# Patient Record
Sex: FEMALE | Race: AMERICAN INDIAN OR ALASKA NATIVE | NOT HISPANIC OR LATINO | Employment: UNEMPLOYED | URBAN - METROPOLITAN AREA
[De-identification: names, ages, dates, MRNs, and addresses within clinical notes are randomized per-mention and may not be internally consistent; named-entity substitution may affect disease eponyms.]

---

## 2017-01-26 ENCOUNTER — THERAPY VISIT (OUTPATIENT)
Dept: PHYSICAL THERAPY | Facility: CLINIC | Age: 47
End: 2017-01-26
Payer: COMMERCIAL

## 2017-01-26 DIAGNOSIS — M25.572 ANKLE PAIN, LEFT: Primary | ICD-10-CM

## 2017-01-26 PROCEDURE — 97162 PT EVAL MOD COMPLEX 30 MIN: CPT | Mod: GP | Performed by: PHYSICAL THERAPIST

## 2017-01-26 PROCEDURE — 97110 THERAPEUTIC EXERCISES: CPT | Mod: GP | Performed by: PHYSICAL THERAPIST

## 2017-01-26 NOTE — PROGRESS NOTES
[facility] Initial Evaluation    Subjective:    Christiana Sabillon is a 46 year old female with a left ankle condition.  Condition occurred with:  A twist.    This is a new condition  Pt reports twisting her L ankle in summer 2015; she had a L ankle surgery on 6/25/15 which included ankle scope, lateral ankle stabilization, medial malleolar osteotomy for talar dome lesion debridementpt; Pt twisted her ankle on multiple occasions after the surgery; on 4/13/16 pt had L ankle arthroscopy and screw removal surgery; pt was feeling better until 4 months ago when she stepped into a tub and twisted her ankle again; she was given a steroid injection which made pain worse; she was given a CAM boot for 2 weeks for ankle swelling which helped; MD recommended PT for achilles tendinitis and plantar fascitis .    Patient reports pain:  Medial and posterior (heel).    Pain is described as aching and is constant and reported as 7/10.  Associated symptoms:  Numbness, buckling/giving out, loss of motion/stiffness and loss of strength. Pain is the same all the time.  Symptoms are exacerbated by walking, ascending stairs, descending stairs and bending/squatting (waling cassie 2-3 blocks) and relieved by ice and heat.    Special tests:  X-ray.      General health as reported by patient is good.  Pertinent medical history includes:  Mental illness, depression and overweight.    Other surgeries include:  Orthopedic surgery (R TKA 2012).  Current medications:  Anti-inflammatory, sleep medication and anti-depressants.  Current occupation is None  disabled.                                  Objective:      Gait:    Gait Type:  Antalgic     Deviations:  Ankle:  Heel strike decr L          Ankle/Foot Evaluation  ROM:    AROM:    Dorsiflexion:  Left:   0  Right:   12  Plantarflexion:  Left:  40    Right:  60  Inversion:  Left:  20     Right:  40  Eversion:  10     Right:  20    Great Toe Extension:  Left:  20     Right: 30  PROM:               Great Toe Extension:  Left:    30     Right: 40  Pain: severe pain with all ankle movements    Strength:    Dorsiflexion:  Left: 3-/5     Pain:   Right: 4+/5   Pain:  Plantarflexion: Left: 2+/5   Pain:   Right: 4/5  Pain:  Inversion:Left: 3-/5  Pain:     Right: 4/5  Pain:  Eversion:Left: 3-/5  Pain:  Right: 4/5  Pain:  Flexion Great Toe:Left: 3-/5  Pain:  Right: 3-/5   Pain:                  SPECIAL TESTS: Special tests ankle: tingling over lateral foot and lateral ankle with light touch.    PALPATION:   Left ankle tenderness present at:  achilles tendon; anterior tibialis; posterior tibialis; deltoid ligament; medial calcaneal; peroneals; anterior talofibular ligament; posterior talofibular ligament; calcaneofibular ligament; medial malleolus and lateral malleolus  Right ankle tenderness present at:   posterior tibialis  EDEMA:   Left ankle edema present at: lateral                                                              General     ROS    Assessment/Plan:      Patient is a 46 year old female with left side ankle complaints.    Patient has the following significant findings with corresponding treatment plan.                Diagnosis 1:  L ankle pain   Pain -  hot/cold therapy, electric stimulation, manual therapy, splint/taping/bracing/orthotics, self management, education, directional preference exercise and home program  Decreased ROM/flexibility - manual therapy, therapeutic exercise and home program  Decreased joint mobility - manual therapy, therapeutic exercise and home program  Decreased strength - therapeutic exercise and therapeutic activities  Impaired balance - neuro re-education and therapeutic activities  Decreased proprioception - neuro re-education and therapeutic activities  Edema - cold therapy  Impaired gait - gait training  Decreased function - therapeutic activities    Therapy Evaluation Codes:   1) History comprised of:   Personal factors that impact the plan of care:       Coping style, Time since onset of symptoms and Work status.    Comorbidity factors that impact the plan of care are:      Depression.     Medications impacting care: Anti-depressant and Anti-inflammatory.  2) Examination of Body Systems comprised of:   Body structures and functions that impact the plan of care:      Ankle.   Activity limitations that impact the plan of care are:      Sitting, Squatting/kneeling, Stairs and Walking.  3) Clinical presentation characteristics are:   Evolving/Changing.  4) Decision-Making    Moderate complexity using standardized patient assessment instrument and/or measureable assessment of functional outcome.  Cumulative Therapy Evaluation is: Moderate complexity.    Previous and current functional limitations:  (See Goal Flow Sheet for this information)    Short term and Long term goals: (See Goal Flow Sheet for this information)     Communication ability:  Patient appears to be able to clearly communicate and understand verbal and written communication and follow directions correctly.  Treatment Explanation - The following has been discussed with the patient:   RX ordered/plan of care  Anticipated outcomes  Possible risks and side effects  This patient would benefit from PT intervention to resume normal activities.   Rehab potential is fair.    Frequency:  1 X week, once daily  Duration:  for 10 weeks  Discharge Plan:  Achieve all LTG.  Independent in home treatment program.  Return to previous functional level by discharge.  Reach maximal therapeutic benefit.    Please refer to the daily flowsheet for treatment today, total treatment time and time spent performing 1:1 timed codes.

## 2017-01-30 NOTE — PROGRESS NOTES
Subjective:                                     General health as reported by patient is good.  Pertinent medical history includes:  Mental illness, depression, overweight, smoking and other (numbness/tingling, pain at night/rest, weakness, calf pain, swelling, warmth, ankle has lost feeling at times).  Medical allergies: yes (pcn keflex , cepfilaxin, tramadol, Narco Anti inflam T3).  Other surgeries include:  Orthopedic surgery and other (Joint replacement and alot).  Current medications:  Meds to increase bone density, sleep medication and anti-depressants.      Primary job tasks include:  Other and repetitive tasks (walking).    Barriers include:  None as reported by patient.    Red flags:  None as reported by patient.                      Objective:    System    Physical Exam    General     ROS    Assessment/Plan:

## 2017-02-09 ENCOUNTER — THERAPY VISIT (OUTPATIENT)
Dept: PHYSICAL THERAPY | Facility: CLINIC | Age: 47
End: 2017-02-09
Payer: COMMERCIAL

## 2017-02-09 DIAGNOSIS — G89.29 CHRONIC PAIN OF LEFT ANKLE: Primary | ICD-10-CM

## 2017-02-09 DIAGNOSIS — M25.572 CHRONIC PAIN OF LEFT ANKLE: Primary | ICD-10-CM

## 2017-02-09 PROCEDURE — 97110 THERAPEUTIC EXERCISES: CPT | Mod: GP | Performed by: PHYSICAL THERAPIST

## 2017-03-21 PROBLEM — M25.572 ANKLE PAIN, LEFT: Status: RESOLVED | Noted: 2017-01-26 | Resolved: 2017-03-21
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
## 2017-07-13 DIAGNOSIS — M26.609 TEMPOROMANDIBULAR JOINT DISORDER: Primary | ICD-10-CM

## 2017-10-13 ENCOUNTER — ANESTHESIA EVENT (OUTPATIENT)
Dept: SURGERY | Facility: CLINIC | Age: 47
End: 2017-10-13
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
## 2017-10-16 ASSESSMENT — LIFESTYLE VARIABLES: TOBACCO_USE: 1

## 2017-10-17 ENCOUNTER — ANESTHESIA (OUTPATIENT)
Dept: SURGERY | Facility: CLINIC | Age: 47
End: 2017-10-17

## 2017-10-26 ENCOUNTER — OFFICE VISIT (OUTPATIENT)
Dept: PODIATRY | Facility: CLINIC | Age: 47
End: 2017-10-26
Payer: COMMERCIAL

## 2017-10-26 ENCOUNTER — RADIANT APPOINTMENT (OUTPATIENT)
Dept: GENERAL RADIOLOGY | Facility: CLINIC | Age: 47
End: 2017-10-26
Attending: PODIATRIST
Payer: COMMERCIAL

## 2017-10-26 VITALS
DIASTOLIC BLOOD PRESSURE: 88 MMHG | BODY MASS INDEX: 35.84 KG/M2 | WEIGHT: 223 LBS | SYSTOLIC BLOOD PRESSURE: 118 MMHG | HEIGHT: 66 IN

## 2017-10-26 DIAGNOSIS — S93.491A SPRAIN OF OTHER LIGAMENT OF RIGHT ANKLE, INITIAL ENCOUNTER: ICD-10-CM

## 2017-10-26 DIAGNOSIS — G57.51 TARSAL TUNNEL SYNDROME, RIGHT: ICD-10-CM

## 2017-10-26 DIAGNOSIS — S93.491A SPRAIN OF OTHER LIGAMENT OF RIGHT ANKLE, INITIAL ENCOUNTER: Primary | ICD-10-CM

## 2017-10-26 PROCEDURE — 73610 X-RAY EXAM OF ANKLE: CPT | Mod: RT

## 2017-10-26 PROCEDURE — 99214 OFFICE O/P EST MOD 30 MIN: CPT | Performed by: PODIATRIST

## 2017-10-26 RX ORDER — DEXAMETHASONE SODIUM PHOSPHATE 4 MG/ML
INJECTION, SOLUTION INTRA-ARTICULAR; INTRALESIONAL; INTRAMUSCULAR; INTRAVENOUS; SOFT TISSUE
Qty: 30 ML | Refills: 0 | OUTPATIENT
Start: 2017-10-26 | End: 2017-12-15

## 2017-10-26 NOTE — PROGRESS NOTES
"Foot & Ankle Surgery   October 26, 2017    S:  Pt is seen today for evaluation of right ankle pain.  Pain can be severe, 6+/10.  History of \"8 or 9\" previous ankle sprains.  Concerned that what happened on the left is happening on the right.  It can be doing fine and it will \"just give out\".  No treatment at this point.  She's also noticing some pain in the medial L ankle, including some numbness in the heel.  \"can't feel it\".  she applies pressure when it's sore, which does help with some discomfort.  No injury to the left ankle.    Vitals:    10/26/17 1453   BP: 118/88   BP Location: Left arm   Patient Position: Sitting   Cuff Size: Adult Regular   Weight: 223 lb (101.2 kg)   Height: 5' 5.98\" (1.676 m)   '      ROS - Pos for CC.  Patient denies current nausea, vomiting, chills, fevers, belly pain, calf pain, chest pain or SOB.  Complete remainder of ROS it otherwise neg.      PE:  Gen:   No apparent distress  Eye:    Visual scanning without deficit  Ear:    Response to auditory stimuli wnl  Lung:    Non-labored breathing on RA noted  Abd:    NTND per patient report  Lymph:    Neg for pitting/non-pitting edema BLE  Vasc:    Pulses palpable, CFT minimally delayed  Neuro:    Light touch sensation intact to all sensory nerve distributions with paresthesias medial left heel.    Derm:    Neg for nodules, lesions or ulcerations  MSK:    R ankle - very tender over ATFL, CFL and posterior to fibula along peroneals.  Anterior drawer and talar tilt very painful over ATFL and CLF wihtout obvious subluxation.  L ankle - TCST very tender along tibial nerve and very tender along ICN.  Calf:    Neg for redness, swelling or tenderness      Imaging:  IMPRESSION: Lucent line along the lateral aspect of the talar dome on the AP view could represent a small nondisplaced fracture. Bones  appear slightly osteopenic. Ankle mortise joint is intact. Mild soft  tissue swelling over the lateral aspect of the ankle.        Assessment:  46 " year old female with right ankle sprains; left tarsal tunnel syndrome and jacques's neuritis      Plan:  Discussed etiologies/options  1.  Right ankle pain after previous sprains  -xrays as above, personally reviewed  -RICE/NSAID prn  -triloc ankle brace  -[facility] PT referral for functional rehab  -per Rad read, CT ordered for right ankle.  Patient notified via VM.      2.  Left tarsal tunnel and jacques's neuritis  -comfortable shoe gear  -RICE/NSAID prn  -massage as needed  -consider PO vs steroid injection     Follow up:  After PT or sooner with acute issues      Body mass index is 36.01 kg/(m^2).  Weight management plan: Patient was referred to their PCP to discuss a diet and exercise plan.         Savage Samuels DPM   Podiatric Foot & Ankle Surgeon  [facility]  [phone]

## 2017-10-26 NOTE — NURSING NOTE
"Chief Complaint   Patient presents with     Ankle Pain     Right ankle; instability       Initial /88 (BP Location: Left arm, Patient Position: Sitting, Cuff Size: Adult Regular)  Ht 5' 5.98\" (1.676 m)  Wt 223 lb (101.2 kg)  BMI 36.01 kg/m2 Estimated body mass index is 36.01 kg/(m^2) as calculated from the following:    Height as of this encounter: 5' 5.98\" (1.676 m).    Weight as of this encounter: 223 lb (101.2 kg).  Medication Reconciliation: complete    "

## 2017-10-26 NOTE — MR AVS SNAPSHOT
After Visit Summary   10/26/2017    Christiana Sabillon    MRN: [id number]           Patient Information     Date Of Birth          1970        Visit Information        Provider Department      10/26/2017 3:00 PM Savage Tucker DPM [facility]        Today's Diagnoses     Sprain of other ligament of right ankle, initial encounter    -  1    Tarsal tunnel syndrome, right          Care Instructions      DR. TUCKER'S CLINIC LOCATIONS:   MONDAY - [place] TUESDAY - [place]   [address] [facility]  [address] [facility] [address]   [place] [address] [place] [address]   [phone]       THURSDAY AM - [place] THURSDAY PM - [facility]   [address]   [place] [address] [place], [place] [address] [phone]       FRIDAY AM - [place] SET UP SURGERY: [phone] [address] APPOINTMENTS: [phone]   [place] [address] BILLING QUESTIONS: [phone] FAX NUMBER: [phone]     Follow Up: after completing physical therapy if still having pain    PRICE Therapy    Many aches and pains throughout the foot and ankle can be helped with many simple treatments.  This is usually described as PRICE Therapy.      P - Protection - often times, inflammation/pain in the lower extremity is not able to improve simply because the areas involved are never allowed to rest.  Every step we take can bother the problematic area.  Protecting those areas is an important step in the healing process.  This may involve a walking cast boot, a special insert/orthotic device, an ankle brace, or simply avoiding barefoot walking.    R - Rest - in addition to protecting the foot/ankle, resting is an important, but often times difficult, treatment option.  Getting off your feet when they bother you, and specifically avoiding activities that cause pain/discomfort, are very beneficial to prevent, and treat, foot/ankle pain.      I - Ice -  icing regularly can help to decrease inflammation and swelling in the foot, thus decreasing pain.  Using an ice pack or a bag of frozen peas works very well.  Ice for 20 minutes multiple times per day as needed.  Do not place the ice directly on the skin as this can cause tissue damage.    C - Compression - using a compression wrap or an ACE wrap can help to decrease swelling, which can help to decrease pain.  Wearing the wraps is generally not needed at night, but they should be worn on a regular basis when you are going to be on your feet for prolonged periods as gravity tends to pull fluids down to your feet/ankles.    E - Elevation - elevating your lower extremities multiple times daily for 15-20 minutes can help to decrease swelling, which works well in decreasing pain levels.      NSAID/Tylenol - An anti-inflammatory, like Aleve or ibuprofen, and/or a pain medication, such as Tylenol, can help to improve pain levels and get the issue resolved sooner rather than later.  Anyone with liver issues should be careful with Tylenol, and anyone with high blood pressure or heart, stomach or kidney issues should be careful with anti-inflammatories.  Please ask if you have questions about these medications, including dosage.          Body Mass Index (BMI)  Many things can cause foot and ankle problems. Foot structure, activity level, foot mechanics and injuries are common causes of pain. One very important issue that often goes unmentioned, is body weight. Extra weight can cause increased stress on muscles, ligaments, bones and tendons. Sometimes just a few extra pounds is all it takes to put one over her/his threshold. Without reducing that stress, it can be difficult to alleviate pain. Some people are uncomfortable addressing this issue, but we feel it is important for you to think about it. As Foot &  Ankle specialists, our job is addressing the lower extremity problem and possible causes. Regarding extra body weight,  "we encourage patients to discuss diet and weight management plans with their primary care doctors. It is this team approach that gives you the best opportunity for pain relief and getting you back on your feet.            Follow-ups after your visit        Additional Services     [facility] PT, HAND, AND CHIROPRACTIC REFERRAL       === This order will print in the [facility] Scheduling  Office ===    Physical therapy, hand therapy and chiropractic care are available through:    [facility]  [facility]    Call one easy number to schedule at any of the above locations:  [phone].    Your provider has referred you to Physical Therapy at [facility] or [facility]    Indication/Reason for Referral: right ankle pain, \"8 or 9\" previous sprains, tender over ATFL and CFL and peroneal tendons; left lower extremity tarsal tunnel syndrome  Onset of Illness:  months  Therapy Orders:  Evaluate and Treat  Special Programs:  None  Special Request:  Equipment: As Indicated:   Exercise: Active/Assistive ROM, Conditioning, Home Exercise Program, Passive ROM, Posture/Body Mechanics, Progressive Strengthening and Stretching/Flexibility  Manual Therapy: Joint Mobilization and Myofascial Release/Massage  Modalities: As Indicated: , Iontophoresis (Please Order: Dexamethasone Sodium Phosphate - 4mg/ml injectable, 30 cc total volume) and Ultrasound    Additional Comments for the therapist or chiropractor:  8 sessions    Please be aware that coverage of these services is subject to the terms and limitations of your health insurance plan.  Call member services at your health plan with any benefit or coverage questions.      Please bring the following to your appointment:    >>   Your personal calendar for scheduling future appointments  >>   Comfortable clothing                  Who to contact     If you have questions or need follow up information about today's clinic visit or your schedule please " "contact [facility] directly at [phone].  Normal or non-critical lab and imaging results will be communicated to you by MyChart, letter or phone within 4 business days after the clinic has received the results. If you do not hear from us within 7 days, please contact the clinic through MyChart or phone. If you have a critical or abnormal lab result, we will notify you by phone as soon as possible.  Submit refill requests through [employer] or call your pharmacy and they will forward the refill request to us. Please allow 3 business days for your refill to be completed.          Additional Information About Your Visit        [employer] Information     [employer] lets you send messages to your doctor, view your test results, renew your prescriptions, schedule appointments and more. To sign up, go to [email or web address] . Click on \"Log in\" on the left side of the screen, which will take you to the Welcome page. Then click on \"Sign up Now\" on the right side of the page.     You will be asked to enter the access code listed below, as well as some personal information. Please follow the directions to create your username and password.     Your access code is: [email or web address]  Expires: 2018  6:31 AM     Your access code will  in 90 days. If you need help or a new code, please call your [facility] clinic or [phone].        Care EveryWhere ID     This is your Care EveryWhere ID. This could be used by other organizations to access your [facility] medical records  [id number]        Your Vitals Were     Height BMI (Body Mass Index)                5' 5.98\" (1.676 m) 36.01 kg/m2           Blood Pressure from Last 3 Encounters:   10/26/17 118/88   16 142/86   16 125/90    Weight from Last 3 Encounters:   10/26/17 223 lb (101.2 kg)   16 231 lb (104.8 kg)   16 231 lb (104.8 kg)              We Performed the Following     [facility] PT, HAND, AND CHIROPRACTIC REFERRAL          Today's " Medication Changes          These changes are accurate as of: 10/26/17  3:04 PM.  If you have any questions, ask your nurse or doctor.               These medicines have changed or have updated prescriptions.        Dose/Directions    * dexamethasone 4 MG/ML injection   Commonly known as:  DECADRON   This may have changed:  Another medication with the same name was added. Make sure you understand how and when to take each.   Used for:  Plantar fascial fibromatosis, Pain of left heel, Achilles tendinitis of left lower extremity, Peroneal tendonitis, left   Changed by:  Savage Samuels DPM        Dose:  4 mg   Apply 1 mL (4 mg) topically as needed   Quantity:  30 mL   Refills:  0       * dexamethasone 4 MG/ML injection   Commonly known as:  DECADRON   This may have changed:  Another medication with the same name was added. Make sure you understand how and when to take each.   Used for:  Plantar fascial fibromatosis, Pain of left heel, Achilles tendinitis of left lower extremity, Peroneal tendonitis, left   Changed by:  Savage Samuels DPM        Dose:  4 mg   Apply 1 mL (4 mg) topically as needed   Quantity:  30 mL   Refills:  0       * dexamethasone 4 MG/ML injection   Commonly known as:  DECADRON   This may have changed:  Another medication with the same name was added. Make sure you understand how and when to take each.   Used for:  Intractable left heel pain, Achilles tendinitis of left lower extremity   Changed by:  Savage Samuels DPM        Dose:  4 mg   Apply 1 mL (4 mg) topically as needed   Quantity:  30 mL   Refills:  0       * dexamethasone 4 MG/ML injection   Commonly known as:  DECADRON   This may have changed:  You were already taking a medication with the same name, and this prescription was added. Make sure you understand how and when to take each.   Used for:  Sprain of other ligament of right ankle, initial encounter, Tarsal tunnel syndrome, right   Changed by:  Savage Samuels  DPM        To be used by therapist during PT sessions.   Quantity:  30 mL   Refills:  0       * order for DME   This may have changed:  Another medication with the same name was added. Make sure you understand how and when to take each.   Used for:  S/P foot surgery   Changed by:  Savage Samuels DPANDRÉS        Equipment being ordered: aluminum walker x 4 months   Quantity:  1 Device   Refills:  0       * order for DME   This may have changed:  Another medication with the same name was added. Make sure you understand how and when to take each.   Used for:  Left ankle pain   Changed by:  Savage Samuels DPM        Equipment being ordered: CAM   Quantity:  1 Device   Refills:  0       * order for DME   This may have changed:  Another medication with the same name was added. Make sure you understand how and when to take each.   Used for:  Plantar fascial fibromatosis, Pain of left heel, Achilles tendinitis of left lower extremity, Peroneal tendonitis, left   Changed by:  Savage Samuels DPM        Equipment being ordered: tall CAM, size 8   Quantity:  1 Device   Refills:  0       * order for DME   This may have changed:  You were already taking a medication with the same name, and this prescription was added. Make sure you understand how and when to take each.   Used for:  Sprain of other ligament of right ankle, initial encounter, Tarsal tunnel syndrome, right   Changed by:  Savage Samuels DPANDRÉS        Equipment being ordered: size 10 triloc ankle brace right lower extremity   Quantity:  1 Device   Refills:  0       * Notice:  This list has 8 medication(s) that are the same as other medications prescribed for you. Read the directions carefully, and ask your doctor or other care provider to review them with you.         Where to get your medicines      Some of these will need a paper prescription and others can be bought over the counter.  Ask your nurse if you have questions.     Bring a paper  prescription for each of these medications     order for DME       You don't need a prescription for these medications     dexamethasone 4 MG/ML injection                Primary Care Provider Office Phone # Fax #    Delta Donis [phone]       [facility] [address] [facility] [address]        Equal Access to Services     KAREN TAPIA : Hadii aad ku hadasho Soomaali, waaxda luqadaha, qaybta kaalmada adeegyada, waxreshma son haycurtisn adejose juan ferchoneela waters. So [facility] [phone].    ATENCIÓN: Si habla español, tiene a araiza disposición servicios gratuitos de asistencia lingüística. [facility] [phone].    We comply with applicable federal civil rights laws and [place] laws. We do not discriminate on the basis of race, color, national origin, age, disability, sex, sexual orientation, or gender identity.            Thank you!     Thank you for choosing [facility]  for your care. Our goal is always to provide you with excellent care. Hearing back from our patients is one way we can continue to improve our services. Please take a few minutes to complete the written survey that you may receive in the mail after your visit with us. Thank you!             Your Updated Medication List - Protect others around you: Learn how to safely use, store and throw away your medicines at www.disposemymeds.org.          This list is accurate as of: 10/26/17  3:04 PM.  Always use your most recent med list.                   Brand Name Dispense Instructions for use Diagnosis    albuterol 108 (90 BASE) MCG/ACT Inhaler    PROAIR HFA/PROVENTIL HFA/VENTOLIN HFA     Inhale 2 puffs into the lungs every 4 hours as needed for shortness of breath / dyspnea or wheezing        * ALPRAZOLAM PO      Take 0.5 mg by mouth 2 times daily as needed for anxiety        * ALPRAZOLAM PO      Take 0.25 mg by mouth daily as needed for anxiety (Give in addition to Alprazolam 0.5mg BID PRN, if needed.)        AMBIEN  PO      Take 5-10 mg by mouth nightly as needed        ASPIRIN PO      Take 325 mg by mouth daily        * dexamethasone 4 MG/ML injection    DECADRON    30 mL    Apply 1 mL (4 mg) topically as needed    Plantar fascial fibromatosis, Pain of left heel, Achilles tendinitis of left lower extremity, Peroneal tendonitis, left       * dexamethasone 4 MG/ML injection    DECADRON    30 mL    Apply 1 mL (4 mg) topically as needed    Plantar fascial fibromatosis, Pain of left heel, Achilles tendinitis of left lower extremity, Peroneal tendonitis, left       * dexamethasone 4 MG/ML injection    DECADRON    30 mL    Apply 1 mL (4 mg) topically as needed    Intractable left heel pain, Achilles tendinitis of left lower extremity       * dexamethasone 4 MG/ML injection    DECADRON    30 mL    To be used by therapist during PT sessions.    Sprain of other ligament of right ankle, initial encounter, Tarsal tunnel syndrome, right       divalproex 500 MG 24 hr tablet    DEPAKOTE ER     Take 2,000 mg by mouth At Bedtime        GABAPENTIN PO      Take 800 mg by mouth 2 times daily        LATUDA PO           lithium 450 MG CR tablet    ESKALITH     Take 450 mg by mouth 2 times daily        * order for DME     1 Device    Equipment being ordered: aluminum walker x 4 months    S/P foot surgery       * order for DME     1 Device    Equipment being ordered: CAM    Left ankle pain       * order for DME     1 Device    Equipment being ordered: tall CAM, size 8    Plantar fascial fibromatosis, Pain of left heel, Achilles tendinitis of left lower extremity, Peroneal tendonitis, left       * order for DME     1 Device    Equipment being ordered: size 10 triloc ankle brace right lower extremity    Sprain of other ligament of right ankle, initial encounter, Tarsal tunnel syndrome, right       * prazosin 5 MG capsule    MINIPRESS     Take 5 mg by mouth At Bedtime        * prazosin 2 MG capsule    MINIPRESS     Take 2 mg by mouth See Admin  Instructions In addition to 5 mg Prazosin, may take 1-5 of the 2mg Prazosin capsules at bedtime.        PRENATAL VITAMIN PO      Take 1 tablet by mouth daily        PROPRANOLOL HCL PO      Take 20 mg by mouth 3 times daily        SAPHRIS SL      Place 10 mg under the tongue At Bedtime        * Notice:  This list has 12 medication(s) that are the same as other medications prescribed for you. Read the directions carefully, and ask your doctor or other care provider to review them with you.

## 2017-10-26 NOTE — PATIENT INSTRUCTIONS
DR. TUCKER'S CLINIC LOCATIONS:   MONDAY - [place]  TUESDAY - [place]   [address] [facility]  [address]   [place] [address] [place] [address]   [phone]       THURSDAY AM - [place] [facility]   [address]   [place] [address] [place], [place] [address] [phone]       FRIDAY AM - [place] SET UP SURGERY: [phone] [address] APPOINTMENTS: [phone]   [place] [address] BILLING QUESTIONS: [phone] FAX NUMBER: [phone]     Follow Up: after completing physical therapy if still having pain    PRICE Therapy    Many aches and pains throughout the foot and ankle can be helped with many simple treatments.  This is usually described as PRICE Therapy.      P - Protection - often times, inflammation/pain in the lower extremity is not able to improve simply because the areas involved are never allowed to rest.  Every step we take can bother the problematic area.  Protecting those areas is an important step in the healing process.  This may involve a walking cast boot, a special insert/orthotic device, an ankle brace, or simply avoiding barefoot walking.    R - Rest - in addition to protecting the foot/ankle, resting is an important, but often times difficult, treatment option.  Getting off your feet when they bother you, and specifically avoiding activities that cause pain/discomfort, are very beneficial to prevent, and treat, foot/ankle pain.      I - Ice - icing regularly can help to decrease inflammation and swelling in the foot, thus decreasing pain.  Using an ice pack or a bag of frozen peas works very well.  Ice for 20 minutes multiple times per day as needed.  Do not place the ice directly on the skin as this can cause tissue damage.    C - Compression - using a compression wrap or an ACE wrap can help to decrease swelling, which can help to decrease pain.  Wearing the wraps is  generally not needed at night, but they should be worn on a regular basis when you are going to be on your feet for prolonged periods as gravity tends to pull fluids down to your feet/ankles.    E - Elevation - elevating your lower extremities multiple times daily for 15-20 minutes can help to decrease swelling, which works well in decreasing pain levels.      NSAID/Tylenol - An anti-inflammatory, like Aleve or ibuprofen, and/or a pain medication, such as Tylenol, can help to improve pain levels and get the issue resolved sooner rather than later.  Anyone with liver issues should be careful with Tylenol, and anyone with high blood pressure or heart, stomach or kidney issues should be careful with anti-inflammatories.  Please ask if you have questions about these medications, including dosage.          Body Mass Index (BMI)  Many things can cause foot and ankle problems. Foot structure, activity level, foot mechanics and injuries are common causes of pain. One very important issue that often goes unmentioned, is body weight. Extra weight can cause increased stress on muscles, ligaments, bones and tendons. Sometimes just a few extra pounds is all it takes to put one over her/his threshold. Without reducing that stress, it can be difficult to alleviate pain. Some people are uncomfortable addressing this issue, but we feel it is important for you to think about it. As Foot &  Ankle specialists, our job is addressing the lower extremity problem and possible causes. Regarding extra body weight, we encourage patients to discuss diet and weight management plans with their primary care doctors. It is this team approach that gives you the best opportunity for pain relief and getting you back on your feet.

## 2017-12-15 RX ORDER — DOXYCYCLINE 100 MG/1
CAPSULE ORAL 2 TIMES DAILY
Status: ON HOLD | COMMUNITY
End: 2017-12-19

## 2017-12-15 RX ORDER — OXYCODONE AND ACETAMINOPHEN 5; 325 MG/1; MG/1
1 TABLET ORAL EVERY 8 HOURS PRN
Status: ON HOLD | COMMUNITY
End: 2017-12-19

## 2017-12-17 RX ORDER — CLINDAMYCIN PHOSPHATE 900 MG/50ML
900 INJECTION, SOLUTION INTRAVENOUS
Status: CANCELLED | OUTPATIENT
Start: 2017-12-17

## 2017-12-17 RX ORDER — CHLORHEXIDINE GLUCONATE ORAL RINSE 1.2 MG/ML
10 SOLUTION DENTAL ONCE
Status: CANCELLED | OUTPATIENT
Start: 2017-12-17 | End: 2017-12-17

## 2017-12-17 RX ORDER — CLINDAMYCIN PHOSPHATE 900 MG/50ML
900 INJECTION, SOLUTION INTRAVENOUS SEE ADMIN INSTRUCTIONS
Status: CANCELLED | OUTPATIENT
Start: 2017-12-17

## 2017-12-17 NOTE — PROGRESS NOTES
Oral & Maxillofacial Surgery pre-operative note:    Christiana Sabillon MRN# [id number]   Age: 46 year old YOB: [birth year]    Pre-Operative Diagnosis:  1. Left TMJ arthralgia  2. 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 Left TMJ arthroplasty   2. Removal of heterotopic bone and osteophytes of left condylar head    Planned Anesthesia: General via oral endotracheal tube    Surgeon:  Dr. Thuan Chaudhary DDS, MD    Resident Surgeon: Wei Escudero DDS  Resident Assistants: Ke Phoenix DDS    Consent:  Written and verbal consent obtained from patient previously.  Discussion included risks/complications including but not limited post-operative pain, swelling, bleeding, infection, hardware failure, nonunion, malunion, dehiscence of wounds, temporary/permanent paresthesia/anesthesia of CN V3 mental nerve distribution/CN V2 maxillary nerve distribution, damage to CN VII (motor to muscles of facial expression) with temporary or permanent paralysis, scar formation, malocclusion, failure to resolve chief complaint, or need for additional procedures (occlusal equilibration/endodontic therapy).  Patient agrees to procedure as written, signed consent in chart.    Ke Phoenix DDS  Oral & Maxillofacial Surgery  [phone]

## 2017-12-19 ENCOUNTER — ANESTHESIA EVENT (OUTPATIENT)
Dept: SURGERY | Facility: CLINIC | Age: 47
End: 2017-12-19
Payer: COMMERCIAL

## 2017-12-19 ENCOUNTER — HOSPITAL ENCOUNTER (OUTPATIENT)
Facility: CLINIC | Age: 47
Discharge: HOME | End: 2017-12-19
Attending: DENTIST | Admitting: DENTIST
Payer: COMMERCIAL

## 2017-12-19 ENCOUNTER — ANESTHESIA (OUTPATIENT)
Dept: SURGERY | Facility: CLINIC | Age: 47
End: 2017-12-19
Payer: COMMERCIAL

## 2017-12-19 VITALS
HEIGHT: 67 IN | DIASTOLIC BLOOD PRESSURE: 74 MMHG | WEIGHT: 209.44 LBS | BODY MASS INDEX: 32.87 KG/M2 | RESPIRATION RATE: 12 BRPM | SYSTOLIC BLOOD PRESSURE: 132 MMHG | OXYGEN SATURATION: 98 % | TEMPERATURE: 96.7 F

## 2017-12-19 DIAGNOSIS — M19.09 TEMPOROMANDIBULAR JOINT OSTEOARTHRITIS: Primary | ICD-10-CM

## 2017-12-19 LAB
GLUCOSE BLDC GLUCOMTR-MCNC: 114 MG/DL (ref 70–99)
GLUCOSE BLDC GLUCOMTR-MCNC: 131 MG/DL (ref 70–99)

## 2017-12-19 PROCEDURE — 71000015 ZZH RECOVERY PHASE 1 LEVEL 2 EA ADDTL HR: Performed by: DENTIST

## 2017-12-19 PROCEDURE — 71000014 ZZH RECOVERY PHASE 1 LEVEL 2 FIRST HR: Performed by: DENTIST

## 2017-12-19 PROCEDURE — 25000128 H RX IP 250 OP 636: Performed by: NURSE ANESTHETIST, CERTIFIED REGISTERED

## 2017-12-19 PROCEDURE — 82962 GLUCOSE BLOOD TEST: CPT

## 2017-12-19 PROCEDURE — 40000170 ZZH STATISTIC PRE-PROCEDURE ASSESSMENT II: Performed by: DENTIST

## 2017-12-19 PROCEDURE — 25000125 ZZHC RX 250

## 2017-12-19 PROCEDURE — 37000009 ZZH ANESTHESIA TECHNICAL FEE, EACH ADDTL 15 MIN: Performed by: DENTIST

## 2017-12-19 PROCEDURE — 37000008 ZZH ANESTHESIA TECHNICAL FEE, 1ST 30 MIN: Performed by: DENTIST

## 2017-12-19 PROCEDURE — 36000062 ZZH SURGERY LEVEL 4 1ST 30 MIN - UMMC: Performed by: DENTIST

## 2017-12-19 PROCEDURE — 27210794 ZZH OR GENERAL SUPPLY STERILE: Performed by: DENTIST

## 2017-12-19 PROCEDURE — 25000566 ZZH SEVOFLURANE, EA 15 MIN: Performed by: DENTIST

## 2017-12-19 PROCEDURE — 25000125 ZZHC RX 250: Performed by: DENTIST

## 2017-12-19 PROCEDURE — 88304 TISSUE EXAM BY PATHOLOGIST: CPT | Performed by: DENTIST

## 2017-12-19 PROCEDURE — 88311 DECALCIFY TISSUE: CPT | Performed by: DENTIST

## 2017-12-19 PROCEDURE — 25000128 H RX IP 250 OP 636: Performed by: ANESTHESIOLOGY

## 2017-12-19 PROCEDURE — 71000027 ZZH RECOVERY PHASE 2 EACH 15 MINS: Performed by: DENTIST

## 2017-12-19 PROCEDURE — 36000064 ZZH SURGERY LEVEL 4 EA 15 ADDTL MIN - UMMC: Performed by: DENTIST

## 2017-12-19 RX ORDER — ACETAMINOPHEN 325 MG/1
975 TABLET ORAL ONCE
Status: DISCONTINUED | OUTPATIENT
Start: 2017-12-19 | End: 2017-12-19 | Stop reason: HOSPADM

## 2017-12-19 RX ORDER — ONDANSETRON 2 MG/ML
4 INJECTION INTRAMUSCULAR; INTRAVENOUS EVERY 30 MIN PRN
Status: DISCONTINUED | OUTPATIENT
Start: 2017-12-19 | End: 2017-12-19 | Stop reason: HOSPADM

## 2017-12-19 RX ORDER — SODIUM CHLORIDE, SODIUM LACTATE, POTASSIUM CHLORIDE, CALCIUM CHLORIDE 600; 310; 30; 20 MG/100ML; MG/100ML; MG/100ML; MG/100ML
INJECTION, SOLUTION INTRAVENOUS CONTINUOUS
Status: DISCONTINUED | OUTPATIENT
Start: 2017-12-19 | End: 2017-12-19 | Stop reason: HOSPADM

## 2017-12-19 RX ORDER — PROPOFOL 10 MG/ML
INJECTION, EMULSION INTRAVENOUS PRN
Status: DISCONTINUED | OUTPATIENT
Start: 2017-12-19 | End: 2017-12-19

## 2017-12-19 RX ORDER — BUPIVACAINE HYDROCHLORIDE AND EPINEPHRINE 5; 5 MG/ML; UG/ML
INJECTION, SOLUTION PERINEURAL PRN
Status: DISCONTINUED | OUTPATIENT
Start: 2017-12-19 | End: 2017-12-19 | Stop reason: HOSPADM

## 2017-12-19 RX ORDER — AMOXICILLIN 250 MG
1 CAPSULE ORAL 2 TIMES DAILY
Qty: 20 TABLET | Refills: 0 | Status: SHIPPED | OUTPATIENT
Start: 2017-12-19 | End: 2017-12-29

## 2017-12-19 RX ORDER — MEPERIDINE HYDROCHLORIDE 50 MG/ML
12.5 INJECTION INTRAMUSCULAR; INTRAVENOUS; SUBCUTANEOUS
Status: DISCONTINUED | OUTPATIENT
Start: 2017-12-19 | End: 2017-12-19 | Stop reason: HOSPADM

## 2017-12-19 RX ORDER — HYDRALAZINE HYDROCHLORIDE 20 MG/ML
2.5-5 INJECTION INTRAMUSCULAR; INTRAVENOUS EVERY 10 MIN PRN
Status: DISCONTINUED | OUTPATIENT
Start: 2017-12-19 | End: 2017-12-19 | Stop reason: HOSPADM

## 2017-12-19 RX ORDER — LIDOCAINE 40 MG/G
CREAM TOPICAL
Status: DISCONTINUED | OUTPATIENT
Start: 2017-12-19 | End: 2017-12-19 | Stop reason: HOSPADM

## 2017-12-19 RX ORDER — SODIUM CHLORIDE, SODIUM LACTATE, POTASSIUM CHLORIDE, CALCIUM CHLORIDE 600; 310; 30; 20 MG/100ML; MG/100ML; MG/100ML; MG/100ML
INJECTION, SOLUTION INTRAVENOUS CONTINUOUS PRN
Status: DISCONTINUED | OUTPATIENT
Start: 2017-12-19 | End: 2017-12-19

## 2017-12-19 RX ORDER — ONDANSETRON 4 MG/1
4 TABLET, ORALLY DISINTEGRATING ORAL EVERY 30 MIN PRN
Status: DISCONTINUED | OUTPATIENT
Start: 2017-12-19 | End: 2017-12-19 | Stop reason: HOSPADM

## 2017-12-19 RX ORDER — FENTANYL CITRATE 50 UG/ML
25-50 INJECTION, SOLUTION INTRAMUSCULAR; INTRAVENOUS EVERY 5 MIN PRN
Status: DISCONTINUED | OUTPATIENT
Start: 2017-12-19 | End: 2017-12-19 | Stop reason: HOSPADM

## 2017-12-19 RX ORDER — CLINDAMYCIN PHOSPHATE 900 MG/50ML
900 INJECTION, SOLUTION INTRAVENOUS SEE ADMIN INSTRUCTIONS
Status: DISCONTINUED | OUTPATIENT
Start: 2017-12-19 | End: 2017-12-19 | Stop reason: HOSPADM

## 2017-12-19 RX ORDER — CLINDAMYCIN HCL 300 MG
300 CAPSULE ORAL 3 TIMES DAILY
Qty: 21 CAPSULE | Refills: 0 | Status: SHIPPED | OUTPATIENT
Start: 2017-12-19 | End: 2017-12-26

## 2017-12-19 RX ORDER — HYDROMORPHONE HYDROCHLORIDE 1 MG/ML
.3-.5 INJECTION, SOLUTION INTRAMUSCULAR; INTRAVENOUS; SUBCUTANEOUS EVERY 10 MIN PRN
Status: DISCONTINUED | OUTPATIENT
Start: 2017-12-19 | End: 2017-12-19 | Stop reason: HOSPADM

## 2017-12-19 RX ORDER — FENTANYL CITRATE 50 UG/ML
25-50 INJECTION, SOLUTION INTRAMUSCULAR; INTRAVENOUS
Status: DISCONTINUED | OUTPATIENT
Start: 2017-12-19 | End: 2017-12-19 | Stop reason: HOSPADM

## 2017-12-19 RX ORDER — NALOXONE HYDROCHLORIDE 0.4 MG/ML
.1-.4 INJECTION, SOLUTION INTRAMUSCULAR; INTRAVENOUS; SUBCUTANEOUS
Status: DISCONTINUED | OUTPATIENT
Start: 2017-12-19 | End: 2017-12-19 | Stop reason: HOSPADM

## 2017-12-19 RX ORDER — CLINDAMYCIN PHOSPHATE 900 MG/50ML
900 INJECTION, SOLUTION INTRAVENOUS
Status: COMPLETED | OUTPATIENT
Start: 2017-12-19 | End: 2017-12-19

## 2017-12-19 RX ORDER — FENTANYL CITRATE 50 UG/ML
INJECTION, SOLUTION INTRAMUSCULAR; INTRAVENOUS PRN
Status: DISCONTINUED | OUTPATIENT
Start: 2017-12-19 | End: 2017-12-19

## 2017-12-19 RX ORDER — DEXAMETHASONE SODIUM PHOSPHATE 4 MG/ML
4 INJECTION, SOLUTION INTRA-ARTICULAR; INTRALESIONAL; INTRAMUSCULAR; INTRAVENOUS; SOFT TISSUE EVERY 10 MIN PRN
Status: COMPLETED | OUTPATIENT
Start: 2017-12-19 | End: 2017-12-19

## 2017-12-19 RX ORDER — CHLORHEXIDINE GLUCONATE ORAL RINSE 1.2 MG/ML
10 SOLUTION DENTAL ONCE
Status: DISCONTINUED | OUTPATIENT
Start: 2017-12-19 | End: 2017-12-19 | Stop reason: HOSPADM

## 2017-12-19 RX ORDER — DEXAMETHASONE SODIUM PHOSPHATE 4 MG/ML
INJECTION, SOLUTION INTRA-ARTICULAR; INTRALESIONAL; INTRAMUSCULAR; INTRAVENOUS; SOFT TISSUE PRN
Status: DISCONTINUED | OUTPATIENT
Start: 2017-12-19 | End: 2017-12-19

## 2017-12-19 RX ORDER — OXYCODONE HYDROCHLORIDE 5 MG/1
5 TABLET ORAL EVERY 4 HOURS PRN
Qty: 30 TABLET | Refills: 0 | Status: SHIPPED | OUTPATIENT
Start: 2017-12-19 | End: 2017-12-25

## 2017-12-19 RX ORDER — ONDANSETRON 2 MG/ML
INJECTION INTRAMUSCULAR; INTRAVENOUS PRN
Status: DISCONTINUED | OUTPATIENT
Start: 2017-12-19 | End: 2017-12-19

## 2017-12-19 RX ADMIN — SODIUM CHLORIDE, POTASSIUM CHLORIDE, SODIUM LACTATE AND CALCIUM CHLORIDE: 600; 310; 30; 20 INJECTION, SOLUTION INTRAVENOUS at 07:59

## 2017-12-19 RX ADMIN — ONDANSETRON 4 MG: 2 INJECTION INTRAMUSCULAR; INTRAVENOUS at 13:21

## 2017-12-19 RX ADMIN — DEXAMETHASONE SODIUM PHOSPHATE 4 MG: 4 INJECTION, SOLUTION INTRA-ARTICULAR; INTRALESIONAL; INTRAMUSCULAR; INTRAVENOUS; SOFT TISSUE at 14:43

## 2017-12-19 RX ADMIN — Medication 0.5 MG: at 12:19

## 2017-12-19 RX ADMIN — SODIUM CHLORIDE, POTASSIUM CHLORIDE, SODIUM LACTATE AND CALCIUM CHLORIDE: 600; 310; 30; 20 INJECTION, SOLUTION INTRAVENOUS at 10:35

## 2017-12-19 RX ADMIN — PHENYLEPHRINE HYDROCHLORIDE 100 MCG: 10 INJECTION, SOLUTION INTRAMUSCULAR; INTRAVENOUS; SUBCUTANEOUS at 08:45

## 2017-12-19 RX ADMIN — FENTANYL CITRATE 50 MCG: 50 INJECTION INTRAMUSCULAR; INTRAVENOUS at 13:21

## 2017-12-19 RX ADMIN — PHENYLEPHRINE HYDROCHLORIDE 100 MCG: 10 INJECTION, SOLUTION INTRAMUSCULAR; INTRAVENOUS; SUBCUTANEOUS at 10:28

## 2017-12-19 RX ADMIN — CLINDAMYCIN PHOSPHATE 900 MG: 18 INJECTION, SOLUTION INTRAVENOUS at 08:20

## 2017-12-19 RX ADMIN — FENTANYL CITRATE 100 MCG: 50 INJECTION, SOLUTION INTRAMUSCULAR; INTRAVENOUS at 08:15

## 2017-12-19 RX ADMIN — FENTANYL CITRATE 25 MCG: 50 INJECTION, SOLUTION INTRAMUSCULAR; INTRAVENOUS at 11:13

## 2017-12-19 RX ADMIN — FENTANYL CITRATE 50 MCG: 50 INJECTION, SOLUTION INTRAMUSCULAR; INTRAVENOUS at 11:21

## 2017-12-19 RX ADMIN — PHENYLEPHRINE HYDROCHLORIDE 100 MCG: 10 INJECTION, SOLUTION INTRAMUSCULAR; INTRAVENOUS; SUBCUTANEOUS at 08:52

## 2017-12-19 RX ADMIN — FENTANYL CITRATE 50 MCG: 50 INJECTION, SOLUTION INTRAMUSCULAR; INTRAVENOUS at 12:10

## 2017-12-19 RX ADMIN — HYDROMORPHONE HYDROCHLORIDE 0.5 MG: 1 INJECTION, SOLUTION INTRAMUSCULAR; INTRAVENOUS; SUBCUTANEOUS at 09:32

## 2017-12-19 RX ADMIN — PROPOFOL 170 MG: 10 INJECTION, EMULSION INTRAVENOUS at 08:20

## 2017-12-19 RX ADMIN — HYDROMORPHONE HYDROCHLORIDE 0.5 MG: 1 INJECTION, SOLUTION INTRAMUSCULAR; INTRAVENOUS; SUBCUTANEOUS at 09:24

## 2017-12-19 RX ADMIN — DEXAMETHASONE SODIUM PHOSPHATE 4 MG: 4 INJECTION, SOLUTION INTRA-ARTICULAR; INTRALESIONAL; INTRAMUSCULAR; INTRAVENOUS; SOFT TISSUE at 13:57

## 2017-12-19 RX ADMIN — MIDAZOLAM 2 MG: 1 INJECTION INTRAMUSCULAR; INTRAVENOUS at 08:12

## 2017-12-19 RX ADMIN — Medication 100 MG: at 08:21

## 2017-12-19 RX ADMIN — Medication 0.5 MG: at 11:59

## 2017-12-19 RX ADMIN — HYDROMORPHONE HYDROCHLORIDE 0.5 MG: 1 INJECTION, SOLUTION INTRAMUSCULAR; INTRAVENOUS; SUBCUTANEOUS at 10:55

## 2017-12-19 RX ADMIN — Medication 0.5 MG: at 11:48

## 2017-12-19 RX ADMIN — DEXAMETHASONE SODIUM PHOSPHATE 8 MG: 4 INJECTION, SOLUTION INTRA-ARTICULAR; INTRALESIONAL; INTRAMUSCULAR; INTRAVENOUS; SOFT TISSUE at 10:20

## 2017-12-19 RX ADMIN — Medication 0.5 MG: at 11:30

## 2017-12-19 RX ADMIN — HYDROMORPHONE HYDROCHLORIDE 0.5 MG: 1 INJECTION, SOLUTION INTRAMUSCULAR; INTRAVENOUS; SUBCUTANEOUS at 09:12

## 2017-12-19 RX ADMIN — ONDANSETRON 4 MG: 2 INJECTION INTRAMUSCULAR; INTRAVENOUS at 10:20

## 2017-12-19 RX ADMIN — PHENYLEPHRINE HYDROCHLORIDE 100 MCG: 10 INJECTION, SOLUTION INTRAMUSCULAR; INTRAVENOUS; SUBCUTANEOUS at 09:01

## 2017-12-19 RX ADMIN — FENTANYL CITRATE 25 MCG: 50 INJECTION, SOLUTION INTRAMUSCULAR; INTRAVENOUS at 11:08

## 2017-12-19 RX ADMIN — PHENYLEPHRINE HYDROCHLORIDE 100 MCG: 10 INJECTION, SOLUTION INTRAMUSCULAR; INTRAVENOUS; SUBCUTANEOUS at 10:16

## 2017-12-19 NOTE — DISCHARGE INSTRUCTIONS
[facility]  Same-Day Surgery   Adult Discharge Orders & Instructions     For 24 hours after surgery    1. Get plenty of rest.  A responsible adult must stay with you for at least 24 hours after you leave the hospital.   2. Do not drive or use heavy equipment.  If you have weakness or tingling, don't drive or use heavy equipment until this feeling goes away.  3. Do not drink alcohol.  4. Avoid strenuous or risky activities.  Ask for help when climbing stairs.   5. You may feel lightheaded.  IF so, sit for a few minutes before standing.  Have someone help you get up.   6. If you have nausea (feel sick to your stomach): Drink only clear liquids such as apple juice, ginger ale, broth or 7-Up.  Rest may also help.  Be sure to drink enough fluids.  Move to a regular diet as you feel able.  7. You may have a slight fever. Call the doctor if your fever is over 100 F (37.7 C) (taken under the tongue) or lasts longer than 24 hours.  8. You may have a dry mouth, a sore throat, muscle aches or trouble sleeping.  These should go away after 24 hours.  9. Do not make important or legal decisions.   Call your doctor for any of the followin.  Signs of infection (fever, growing tenderness at the surgery site, a large amount of drainage or bleeding, severe pain, foul-smelling drainage, redness, swelling).    2. It has been over 8 to 10 hours since surgery and you are still not able to urinate (pass water).    3.  Headache for over 24 hours.    To contact a doctor, call  Dr. Chaudhary at the Dental Clinic @ [phone] or:        [phone] and ask for the resident on call for Oral Surgery (answered 24 hours a day)      Emergency Department:    [facility]: [phone]       (TTY for hearing impaired: [phone])           Tips for taking pain medications  To get the best pain relief possible , remember these points:      Take pain medications as directed, before pain becomes  severe      Pain medication can upset your stomach: taking it with food may help      Constipation is a common side effect of pain medication. Drink plenty of  Fluids      Eat foods high in fiber. Take a stool softener  if recommended by your doctor or  Pharmacist.      Do not drink alcohol, drive or operate machinery while taking pain medications.      Ask about other ways to control pain, such as with heat, ice or relaxation.

## 2017-12-19 NOTE — IP AVS SNAPSHOT
MRN:[id number]                      After Visit Summary   12/19/2017    Christiana Sabillon    MRN: [id number]           Thank you!     Thank you for choosing [facility] for your care. Our goal is always to provide you with excellent care. Hearing back from our patients is one way we can continue to improve our services. Please take a few minutes to complete the written survey that you may receive in the mail after you visit with us. Thank you!        Patient Information     Date Of Birth          1970        About your hospital stay     You were admitted on:  December 19, 2017 You last received care in the:  Post Anesthesia Care Unit [facility]    You were discharged on:  December 19, 2017       Who to Call     For medical emergencies, please call [phone].  For non-urgent questions about your medical care, please call your primary care provider or clinic, [phone]  For questions related to your surgery, please call your surgery clinic        Attending Provider     Provider Specialty    Thuan Chaudhary DDS Oral Surgery       Primary Care Provider Office Phone # Fax #    Delta Donis [phone]      After Care Instructions     Discharge Instructions       Follow up 2 weeks. Jan 4th at 11:00 am in the OMS clinic, [facility], [facility].     Soft diet for 10-14 days    Expect oozing, bruising, swelling and pain in the surgical sites for 3-5 days    Take medications as Prescribed    Call [phone] and ask for Oral Surgery resident on call with urgerny or emergent questions                  Further instructions from your care team       [facility]  Same-Day Surgery   Adult Discharge Orders & Instructions     For 24 hours after surgery    1. Get plenty of rest.  A responsible adult must stay with you for at least 24 hours after you leave the hospital.   2. Do not drive or use heavy equipment.  If you have weakness or  tingling, don't drive or use heavy equipment until this feeling goes away.  3. Do not drink alcohol.  4. Avoid strenuous or risky activities.  Ask for help when climbing stairs.   5. You may feel lightheaded.  IF so, sit for a few minutes before standing.  Have someone help you get up.   6. If you have nausea (feel sick to your stomach): Drink only clear liquids such as apple juice, ginger ale, broth or 7-Up.  Rest may also help.  Be sure to drink enough fluids.  Move to a regular diet as you feel able.  7. You may have a slight fever. Call the doctor if your fever is over 100 F (37.7 C) (taken under the tongue) or lasts longer than 24 hours.  8. You may have a dry mouth, a sore throat, muscle aches or trouble sleeping.  These should go away after 24 hours.  9. Do not make important or legal decisions.   Call your doctor for any of the followin.  Signs of infection (fever, growing tenderness at the surgery site, a large amount of drainage or bleeding, severe pain, foul-smelling drainage, redness, swelling).    2. It has been over 8 to 10 hours since surgery and you are still not able to urinate (pass water).    3.  Headache for over 24 hours.    To contact a doctor, call  Dr. Chaudhary at the Dental Clinic @ [phone] or:        [phone] and ask for the resident on call for Oral Surgery (answered 24 hours a day)      Emergency Department:    [facility]: [phone]       (TTY for hearing impaired: [phone])           Tips for taking pain medications  To get the best pain relief possible , remember these points:      Take pain medications as directed, before pain becomes severe      Pain medication can upset your stomach: taking it with food may help      Constipation is a common side effect of pain medication. Drink plenty of  Fluids      Eat foods high in fiber. Take a stool softener  if recommended by your doctor or  Pharmacist.      Do not drink alcohol, drive or operate machinery while  "taking pain medications.      Ask about other ways to control pain, such as with heat, ice or relaxation.            Pending Results     Date and Time Order Name Status Description    2017 0944 Surgical pathology exam In process             Admission Information     Date & Time Provider Department Dept. Phone    2017 Thuan Chaudhary, CHEMA Post Anesthesia Care Unit [facility] [phone]      Your Vitals Were     Blood Pressure Temperature Respirations Height Weight Pulse Oximetry    140/96 98.1  F (36.7  C) (Oral) 16 1.702 m (5' 7\") 95 kg (209 lb 7 oz) 97%    BMI (Body Mass Index)                   32.8 kg/m2           Zola Bookshart Information     "[employer]" lets you send messages to your doctor, view your test results, renew your prescriptions, schedule appointments and more. To sign up, go to [email or web address]" . Click on \"Log in\" on the left side of the screen, which will take you to the Welcome page. Then click on \"Sign up Now\" on the right side of the page.     You will be asked to enter the access code listed below, as well as some personal information. Please follow the directions to create your username and password.     Your access code is: [email or web address]  Expires: 2018  5:31 AM     Your access code will  in 90 days. If you need help or a new code, please call your [facility] clinic or [phone].        Care EveryWhere ID     This is your Care EveryWhere ID. This could be used by other organizations to access your [facility] medical records  [id number]        Equal Access to Services     [facility] : Hadii nayeli lujan Sokayla, waaxda luqadaha, qaybta kaalmada adeegyada, waxreshma albarranin hayaan adeeg kharash la'aan . So [facility] [phone].    ATENCIÓN: Si habla español, tiene a araiza disposición servicios gratuitos de asistencia lingüística. Llame al [phone].    We comply with applicable federal civil rights laws and [place] laws. We do not discriminate on the basis of race, color, " national origin, age, disability, sex, sexual orientation, or gender identity.               Review of your medicines      START taking        Dose / Directions    clindamycin 300 MG capsule   Commonly known as:  CLEOCIN   Used for:  Temporomandibular joint osteoarthritis        Dose:  300 mg   Take 1 capsule (300 mg) by mouth 3 times daily for 7 days   Quantity:  21 capsule   Refills:  0       oxyCODONE IR 5 MG tablet   Commonly known as:  ROXICODONE   Used for:  Temporomandibular joint osteoarthritis        Dose:  5 mg   Take 1 tablet (5 mg) by mouth every 4 hours as needed for pain (moderate to severe pain)   Quantity:  30 tablet   Refills:  0       senna-docusate 8.6-50 MG per tablet   Commonly known as:  SENOKOT-S;PERICOLACE   Used for:  Temporomandibular joint osteoarthritis        Dose:  1 tablet   Take 1 tablet by mouth 2 times daily for 10 days Take while on oral narcotics to prevent or treat constipation.   Quantity:  20 tablet   Refills:  0         CONTINUE these medicines which have NOT CHANGED        Dose / Directions    ACETAMINOPHEN PO        Dose:  325 mg   Take 325 mg by mouth every 6 hours as needed for pain   Refills:  0       albuterol 108 (90 BASE) MCG/ACT Inhaler   Commonly known as:  PROAIR HFA/PROVENTIL HFA/VENTOLIN HFA        Dose:  2 puff   Inhale 2 puffs into the lungs every 4 hours as needed for shortness of breath / dyspnea or wheezing   Refills:  0       ALPRAZOLAM PO        Dose:  0.5 mg   Take 0.5 mg by mouth 2 times daily as needed for anxiety   Refills:  0       AMBIEN PO        Dose:  10 mg   Take 10 mg by mouth nightly as needed   Refills:  0       ASPIRIN PO        Dose:  325 mg   Take 325 mg by mouth daily   Refills:  0       BACLOFEN PO        Dose:  20 mg   Take 20 mg by mouth 3 times daily as needed for muscle spasms   Refills:  0       DIPHENHYDRAMINE HCL PO        Dose:  25 mg   Take 25 mg by mouth every 6 hours as needed Use as needed for itching   Refills:  0        divalproex 500 MG 24 hr tablet   Commonly known as:  DEPAKOTE ER        Dose:  2000 mg   Take 2,000 mg by mouth At Bedtime   Refills:  0       GABAPENTIN PO        Dose:  800 mg   Take 800 mg by mouth 4 times daily   Refills:  0       LATUDA PO        Dose:  80 mg   Take 80 mg by mouth daily   Refills:  0       lithium 450 MG CR tablet   Commonly known as:  ESKALITH        Dose:  450 mg   Take 450 mg by mouth 2 times daily   Refills:  0       * order for DME   Used for:  S/P foot surgery        Equipment being ordered: aluminum walker x 4 months   Quantity:  1 Device   Refills:  0       * order for DME   Used for:  Left ankle pain        Equipment being ordered: CAM   Quantity:  1 Device   Refills:  0       * order for DME   Used for:  Plantar fascial fibromatosis, Pain of left heel, Achilles tendinitis of left lower extremity, Peroneal tendonitis, left        Equipment being ordered: tall CAM, size 8   Quantity:  1 Device   Refills:  0       * order for DME   Used for:  Sprain of other ligament of right ankle, initial encounter, Tarsal tunnel syndrome, right        Equipment being ordered: size 10 triloc ankle brace right lower extremity   Quantity:  1 Device   Refills:  0       OXCARBAZEPINE PO        Dose:  600 mg   Take 600 mg by mouth 2 times daily   Refills:  0       prazosin 2 MG capsule   Commonly known as:  MINIPRESS        Dose:  2 mg   Take 2 mg by mouth At Bedtime 1-3 capsules at bedtime   Refills:  0       PRILOSEC PO        Dose:  20 mg   Take 20 mg by mouth every morning   Refills:  0       SAPHRIS SL        Dose:  5 mg   Place 5 mg under the tongue At Bedtime   Refills:  0       * Notice:  This list has 4 medication(s) that are the same as other medications prescribed for you. Read the directions carefully, and ask your doctor or other care provider to review them with you.      STOP taking     oxyCODONE-acetaminophen 5-325 MG per tablet   Commonly known as:  PERCOCET                Where to get your  medicines      These medications were sent to [facility] - [place], [place] - [address]  [address], [facility] [address]     Phone:  [phone]     clindamycin 300 MG capsule    senna-docusate 8.6-50 MG per tablet         Some of these will need a paper prescription and others can be bought over the counter. Ask your nurse if you have questions.     Bring a paper prescription for each of these medications     oxyCODONE IR 5 MG tablet               ANTIBIOTIC INSTRUCTION     You've Been Prescribed an Antibiotic - Now What?  Your healthcare team thinks that you or your loved one might have an infection. Some infections can be treated with antibiotics, which are powerful, life-saving drugs. Like all medications, antibiotics have side effects and should only be used when necessary. There are some important things you should know about your antibiotic treatment.      Your healthcare team may run tests before you start taking an antibiotic.    Your team may take samples (e.g., from your blood, urine or other areas) to run tests to look for bacteria. These test can be important to determine if you need an antibiotic at all and, if you do, which antibiotic will work best.      Within a few days, your healthcare team might change or even stop your antibiotic.    Your team may start you on an antibiotic while they are working to find out what is making you sick.    Your team might change your antibiotic because test results show that a different antibiotic would be better to treat your infection.    In some cases, once your team has more information, they learn that you do not need an antibiotic at all. They may find out that you don't have an infection, or that the antibiotic you're taking won't work against your infection. For example, an infection caused by a virus can't be treated with antibiotics. Staying on an antibiotic when you don't need it is more likely to be harmful than  helpful.      You may experience side effects from your antibiotic.    Like all medications, antibiotics have side effects. Some of these can be serious.    Let you healthcare team know if you have any known allergies when you are admitted to the hospital.    One significant side effect of nearly all antibiotics is the risk of severe and sometimes deadly diarrhea caused by Clostridium difficile (C. Difficile). This occurs when a person takes antibiotics because some good germs are destroyed. Antibiotic use allows C. diificile to take over, putting patients at high risk for this serious infection.    As a patient or caregiver, it is important to understand your or your loved one's antibiotic treatment. It is especially important for caregivers to speak up when patients can't speak for themselves. Here are some important questions to ask your healthcare team.    What infection is this antibiotic treating and how do you know I have that infection?    What side effects might occur from this antibiotic?    How long will I need to take this antibiotic?    Is it safe to take this antibiotic with other medications or supplements (e.g., vitamins) that I am taking?     Are there any special directions I need to know about taking this antibiotic? For example, should I take it with food?    How will I be monitored to know whether my infection is responding to the antibiotic?    What tests may help to make sure the right antibiotic is prescribed for me?      Information provided by:  www.cdc.gov/getsmart  U.S. Department of Health and Human Services  Centers for disease Control and Prevention  National Center for Emerging and Zoonotic Infectious Diseases  Division of Healthcare Quality Promotion         Protect others around you: Learn how to safely use, store and throw away your medicines at www.disposemymeds.org.             Medication List: This is a list of all your medications and when to take them. Check marks below  indicate your daily home schedule. Keep this list as a reference.      Medications           Morning Afternoon Evening Bedtime As Needed    ACETAMINOPHEN PO   Take 325 mg by mouth every 6 hours as needed for pain                                albuterol 108 (90 BASE) MCG/ACT Inhaler   Commonly known as:  PROAIR HFA/PROVENTIL HFA/VENTOLIN HFA   Inhale 2 puffs into the lungs every 4 hours as needed for shortness of breath / dyspnea or wheezing                                ALPRAZOLAM PO   Take 0.5 mg by mouth 2 times daily as needed for anxiety                                AMBIEN PO   Take 10 mg by mouth nightly as needed                                ASPIRIN PO   Take 325 mg by mouth daily                                BACLOFEN PO   Take 20 mg by mouth 3 times daily as needed for muscle spasms                                clindamycin 300 MG capsule   Commonly known as:  CLEOCIN   Take 1 capsule (300 mg) by mouth 3 times daily for 7 days                                DIPHENHYDRAMINE HCL PO   Take 25 mg by mouth every 6 hours as needed Use as needed for itching                                divalproex 500 MG 24 hr tablet   Commonly known as:  DEPAKOTE ER   Take 2,000 mg by mouth At Bedtime                                GABAPENTIN PO   Take 800 mg by mouth 4 times daily                                LATUDA PO   Take 80 mg by mouth daily                                lithium 450 MG CR tablet   Commonly known as:  ESKALITH   Take 450 mg by mouth 2 times daily                                * order for DME   Equipment being ordered: aluminum walker x 4 months                                * order for DME   Equipment being ordered: CAM                                * order for DME   Equipment being ordered: tall CAM, size 8                                * order for DME   Equipment being ordered: size 10 triloc ankle brace right lower extremity                                OXCARBAZEPINE PO   Take 600 mg by  mouth 2 times daily                                oxyCODONE IR 5 MG tablet   Commonly known as:  ROXICODONE   Take 1 tablet (5 mg) by mouth every 4 hours as needed for pain (moderate to severe pain)                                prazosin 2 MG capsule   Commonly known as:  MINIPRESS   Take 2 mg by mouth At Bedtime 1-3 capsules at bedtime                                PRILOSEC PO   Take 20 mg by mouth every morning                                SAPHRIS SL   Place 5 mg under the tongue At Bedtime                                senna-docusate 8.6-50 MG per tablet   Commonly known as:  SENOKOT-S;PERICOLACE   Take 1 tablet by mouth 2 times daily for 10 days Take while on oral narcotics to prevent or treat constipation.                                * Notice:  This list has 4 medication(s) that are the same as other medications prescribed for you. Read the directions carefully, and ask your doctor or other care provider to review them with you.

## 2017-12-19 NOTE — IP AVS SNAPSHOT
Post Anesthesia Care Unit [address] [address]    Phone:  [phone]                                       After Visit Summary   12/19/2017    Christiana Sabillon    MRN: [id number]           After Visit Summary Signature Page     I have received my discharge instructions, and my questions have been answered. I have discussed any challenges I see with this plan with the nurse or doctor.    ..........................................................................................................................................  Patient/Patient Representative Signature      ..........................................................................................................................................  Patient Representative Print Name and Relationship to Patient    ..................................................               ................................................  Date                                            Time    ..........................................................................................................................................  Reviewed by Signature/Title    ...................................................              ..............................................  Date                                                            Time

## 2017-12-19 NOTE — ANESTHESIA CARE TRANSFER NOTE
Patient: Christiana R Bearstops    Procedure(s):  Left Temporomandibular Joint Arthroplasty  - Wound Class: I-Clean    Diagnosis: Left Temporomandibular Joint Heterotopic Bone   Diagnosis Additional Information: No value filed.    Anesthesia Type:   No value filed.     Note:  Airway :Face Mask  Patient transferred to:PACU  Comments: VSS, patent airway, report to RN.Handoff Report: Identifed the Patient, Identified the Reponsible Provider, Reviewed the pertinent medical history, Discussed the surgical course, Reviewed Intra-OP anesthesia mangement and issues during anesthesia, Set expectations for post-procedure period and Allowed opportunity for questions and acknowledgement of understanding      Vitals: (Last set prior to Anesthesia Care Transfer)    CRNA VITALS  12/19/2017 1018 - 12/19/2017 1057      12/19/2017             Pulse: 97    SpO2: 100 %    Resp Rate (observed): 22                Electronically Signed By: AYESHA Diaz CRNA  December 19, 2017  10:57 AM

## 2017-12-19 NOTE — PROGRESS NOTES
Pyxis out of peridex.  Contacted pharmacy twice.  Pharmacy tech stated he would walk to main pharmacy for refill of pyxis.  Just talked to surgeon.  Stated he would do in surgery.

## 2017-12-19 NOTE — OR NURSING
"Pt's daughter stating she needs to leave by 1230 to get to work. Daughter's \"baby's father\" to stay with pt as responsible adult while pt at work. Pt stating she is ready to leave PACU for Phase 2 but remains visibly uncomfortable. Will work on finding another responsible adult as ride for pt as she will not be ready to discharge by 1230. Daughter, Furious, aware. Will continue to monitor.   "

## 2017-12-19 NOTE — OR NURSING
Pt is restless, twitchy and jerky in the chair. Gave her 4 mg of Decadron for nause, and pt is now taking sips of Meka without emesis. IVF running for the nausea. Ice pack in the jaw Bra, and a free ice pack that the pt puts on her left neck. Sats while sleeping maintaining at 94-95% on room air. Supplemental O2 removed. Patient able to drink approx 120 cc [place] and keep it down.

## 2017-12-19 NOTE — ANESTHESIA PREPROCEDURE EVALUATION
Anesthesia Evaluation     . Pt has had prior anesthetic.     No history of anesthetic complications          ROS/MED HX    ENT/Pulmonary:     (+)Intermittent asthma Treatment: Inhaler prn,  , . .    Neurologic:  - neg neurologic ROS     Cardiovascular:     (+) hypertension----. : . . . :. .       METS/Exercise Tolerance:  3 - Able to walk 1-2 blocks without stopping   Hematologic:         Musculoskeletal: Comment: L TMJ pain        GI/Hepatic:     (+) GERD Asymptomatic on medication,       Renal/Genitourinary:         Endo:     (+) type II DM .      Psychiatric:         Infectious Disease:         Malignancy:         Other:                     Physical Exam  Normal systems: dental    Airway   Mallampati: III  TM distance: <3 FB  Neck ROM: full    Dental     Cardiovascular   Rhythm and rate: regular and normal      Pulmonary    breath sounds clear to auscultation(-) no rhonchi                    Anesthesia Plan      History & Physical Review      ASA Status:  2 .    NPO Status:  > 8 hours    Plan for General and ETT with Intravenous induction. Maintenance will be Balanced.      Additional equipment: Videolaryngoscope GETA. PIVx1. Standard ASA monitors. IV opioids. PACU postop.    Risks and benefits of anesthetic discussed with patient including sore throat, voice hoarseness, injury to vocal cords, throat, mouth, teeth, tongue, and lips from intubation; cardiac arrest, respiratory complications, MI, stroke, blood clots.    Presented opportunity to answer patient and family questions. Questions addressed.        Postoperative Care      Consents  Anesthetic plan, risks, benefits and alternatives discussed with:  Patient..                          .

## 2017-12-19 NOTE — ANESTHESIA POSTPROCEDURE EVALUATION
Patient: Christiana R Bearstops    Procedure(s):  Left Temporomandibular Joint Arthroplasty  - Wound Class: I-Clean    Diagnosis:Left Temporomandibular Joint Heterotopic Bone   Diagnosis Additional Information: No value filed.    Anesthesia Type:  No value filed.    Note:  Anesthesia Post Evaluation    Patient location during evaluation: PACU  Patient participation: Able to fully participate in evaluation  Level of consciousness: awake and alert  Pain management: adequate  Airway patency: patent  Cardiovascular status: acceptable  Respiratory status: acceptable  Hydration status: acceptable  PONV: none     Anesthetic complications: None    Comments: Patient pain improved from baseline (6/10 vs 8/10 at home). Patient agrees things are going well and is wanting to go home.        Last vitals:  Vitals:    12/19/17 1200 12/19/17 1215 12/19/17 1235   BP: 144/90 (!) 137/91    Resp: 16 16 16   Temp:      SpO2: 95% 98% 97%         Electronically Signed By: Rubin Bacon MD  December 19, 2017  12:29 PM

## 2017-12-19 NOTE — BRIEF OP NOTE
[facility], [place]    Brief Operative Note    Pre-operative diagnosis: Left Temporomandibular Joint Heterotopic Bone   Post-operative diagnosis * No post-op diagnosis entered *  Procedure: Procedure(s):  Left Temporomandibular Joint Arthroplasty  - Wound Class: I-Clean  Surgeon: Surgeon(s) and Role:     * Thuan Chaudhary DDS - Primary     * Chacorta Vizcarra DDS - Resident - Assisting  Anesthesia: General   Estimated blood loss: Less than 50 ml  Drains: None  Specimens:   ID Type Source Tests Collected by Time Destination   A : hetrotopic bone, left temporalmandibular joint Bone Bone SURGICAL PATHOLOGY EXAM Thuan Chaudhary DDS 12/19/2017  9:43 AM      Findings:   None.  Complications: None.  Implants: None.    I was present, scrubbed and directed the critical portions of the procedure. Please see the operative report for full details.     Thuan Chaudhary DDS, MD  Staff, Oral and Maxillofacial Surgery

## 2017-12-19 NOTE — OR NURSING
Pt's cousin Dominguez states he will be able to pick pt up when she is ready for discharge. Daughter's friend to remain responsible adult at home until daughter is home from work. Will continue to monitor.

## 2017-12-20 NOTE — OP NOTE
DATE OF SERVICE:  12/19/2017   PREOPERATIVE DIAGNOSIS:  Chronic left temporomandibular joint arthralgia, Left temporomandibular joint heterotopic bone formation, Left temporomandibular joint dysfunction  POSTOPERATIVE DIAGNOSIS:  Chronic left temporomandibular joint arthralgia, Left temporomandibular joint heterotopic bone formation, Left temporomandibular joint dysfunction   PROCEDURES PERFORMED:  Left temporomandibular joint arthrotomy, Left temporomandibular joint arthroplasty, Left temporomandibular joint removal of heterotopic bone  STAFF SURGEON: Thuan Chaudhary DDS   RESIDENT SURGEON: Wei Escudero DDS  : Chacorta Vizcarra DDS  ESTIMATED BLOOD LOSS:  25 mL.   FLUID REPLACEMENT:  1300 mL   URINE OUTPUT:  None.   LOCAL ANESTHESIA:  3 mL of 0.5% Marcaine with 1:200,000 epinephrine.   SPECIMENS:  none   COMPLICATIONS:  None apparent.   DRAINS:  None.   HISTORY OF PRESENT ILLNESS: Christiana Sabillon is a 48 yo woman who presented to the oral surgery clinic at [facility] with complaint of left sided TMJ pain and dysfunction. A CT scan was obtained which demonstrated significant degenerative joint disease, osteophyte formation, and significant heterotopic bone formation. She was seen by Dr. Thuan Chaudhary and the risks and benefits of left temporomandibular joint arthrotomy, arthroplasty, and removal of heterotopic bone were discussed with the patient.  Risks to include pain, bleeding, swelling, infection, delayed healing, scarring, injury to the facial nerve, need for further surgical procedures, failure to resolve chief complaint.  The patient elected to proceed.   DESCRIPTION OF PROCEDURE:  The patient was met in the preoperative holding area and the treatment plan was once again confirmed with the patient.  The site was marked and a head wrap was placed.  She was brought back to operating room #4 by the Anesthesia Service and she was induced to general anesthesia via IV.  Following induction, she was intubated  with an oral endotracheal tube which was secured by the Anesthesia Service.  The arms were tucked and padded.  The head wrap was reinforced with tape and Mastisol and hair was trimmed so that it would be out of the way.  A 1000 drape was placed to isolate the oral cavity from the surgical site.  A cotton ball was placed in the left EAC, and the surgical site was prepped with Betadine.  Next, the surgeons stepped out to scrub and returned to don sterile gown and gloves.  Patient was draped in usual sterile fashion.  A timeout was performed per General acute hospital protocol.    Local anesthesia was injected after marking the planned incision via preauricular approach.  Skin incision was made using a 15 blade through to the subcutaneous tissue.  Blunt dissection was completed with hemostat at the superior portion of the incision until the white temporalis fascia was identified. Blunt dissection was then completed to the temporalis fascia along the tragal cartilage. The two areas of blunt dissection was connected with blunt dissection and a #9 periosteal elevator. The overlying tissue was nerve tested and dissected layer by layer with nerve testing throughout. The Zygomatic arch was the palpation. Bovie cautery was used to make a small incision through the periosteum overlying the zygomatic arch. #9 was used to dissect anteriorly and inferiorly until the lateral portion of the joint capsule was identified. The mandibular was then manipulated and the condylar head wad identified. A #15 blade was used to make a horizontal incision mid condylar head. #9 periosteal elevator was then used to identify the lateral aspect of the condylar head. During dissection free bony fragments were encountered which were removed and sent for permanent specimen. Dissection was continued around the anterior portion, superior portion, and posterior portion of the condylar head. Free bony fragments were encountered  in anterior and posterior portions of the condyle which were removed and sent for permanent specimen. A #9 was also used to mobilize the condylar head with a gentle prying motion in the inferior joint space. The surgical site was irrigated with copious sterile saline. The capsule was closed via 4-0 Vicryl and the superficial temporal fascia was closed with 3-0 Vicryl.  The deep layer and subcuticular was closed with 4-0 Vicryl and 5-0 fast was used for skin closure. Skin glue was placed over the incisions.    Patient's maximal incisal opening was noted to be 4 finger breaths at completion of the procedure under general anesthesia.   The patient was then turned over to the Anesthesia Department for wake up.  She was extubated without complications and transferred stable to the PACU.    Counts were correct x2.    Dr. Chaudhary was present for all critical portions of the procedure

## 2017-12-22 LAB — COPATH REPORT: NORMAL

## 2017-12-25 ENCOUNTER — HOSPITAL ENCOUNTER (EMERGENCY)
Facility: CLINIC | Age: 47
Discharge: HOME | End: 2017-12-25
Attending: EMERGENCY MEDICINE | Admitting: EMERGENCY MEDICINE
Payer: COMMERCIAL

## 2017-12-25 VITALS
TEMPERATURE: 97.5 F | OXYGEN SATURATION: 98 % | SYSTOLIC BLOOD PRESSURE: 155 MMHG | DIASTOLIC BLOOD PRESSURE: 95 MMHG | RESPIRATION RATE: 18 BRPM

## 2017-12-25 DIAGNOSIS — M19.09 TEMPOROMANDIBULAR JOINT OSTEOARTHRITIS: ICD-10-CM

## 2017-12-25 DIAGNOSIS — G89.18 ACUTE POST-OPERATIVE PAIN: ICD-10-CM

## 2017-12-25 PROCEDURE — 99282 EMERGENCY DEPT VISIT SF MDM: CPT | Performed by: EMERGENCY MEDICINE

## 2017-12-25 PROCEDURE — 99284 EMERGENCY DEPT VISIT MOD MDM: CPT | Mod: Z6 | Performed by: EMERGENCY MEDICINE

## 2017-12-25 RX ORDER — OXYCODONE HYDROCHLORIDE 5 MG/1
5 TABLET ORAL EVERY 4 HOURS PRN
Qty: 30 TABLET | Refills: 0 | Status: SHIPPED | OUTPATIENT
Start: 2017-12-25 | End: 2017-12-31

## 2017-12-25 ASSESSMENT — ENCOUNTER SYMPTOMS
FEVER: 0
FATIGUE: 0

## 2017-12-25 NOTE — ED AVS SNAPSHOT
[facility], [place], Emergency Department    [address] [place] [address]    Phone:  [phone]                                       Christiana Sabillon   MRN: [id number]    Department:  [facility], Emergency Department   Date of Visit:  12/25/2017           After Visit Summary Signature Page     I have received my discharge instructions, and my questions have been answered. I have discussed any challenges I see with this plan with the nurse or doctor.    ..........................................................................................................................................  Patient/Patient Representative Signature      ..........................................................................................................................................  Patient Representative Print Name and Relationship to Patient    ..................................................               ................................................  Date                                            Time    ..........................................................................................................................................  Reviewed by Signature/Title    ...................................................              ..............................................  Date                                                            Time

## 2017-12-25 NOTE — ED AVS SNAPSHOT
[facility], Emergency Department    [address] [address]    Phone:  [phone]                                       Christiana Sabillon   MRN: [id number]    Department:  [facility], Emergency Department   Date of Visit:  12/25/2017           Patient Information     Date Of Birth          1970        Your diagnoses for this visit were:     Acute post-operative pain     Temporomandibular joint osteoarthritis        You were seen by Le Maya MD.        Discharge Instructions       Please follow up with your oral surgeon.   Return to the ER if any other problems/concerns.     24 Hour Appointment Hotline       To make an appointment at any [facility] clinic, call [phone] ([phone]). If you don't have a family doctor or clinic, we will help you find one. [facility] clinics are conveniently located to serve the needs of you and your family.             Review of your medicines      Our records show that you are taking the medicines listed below. If these are incorrect, please call your family doctor or clinic.        Dose / Directions Last dose taken    ACETAMINOPHEN PO   Dose:  325 mg        Take 325 mg by mouth every 6 hours as needed for pain   Refills:  0        albuterol 108 (90 BASE) MCG/ACT Inhaler   Commonly known as:  PROAIR HFA/PROVENTIL HFA/VENTOLIN HFA   Dose:  2 puff        Inhale 2 puffs into the lungs every 4 hours as needed for shortness of breath / dyspnea or wheezing   Refills:  0        ALPRAZOLAM PO   Dose:  0.5 mg        Take 0.5 mg by mouth 2 times daily as needed for anxiety   Refills:  0        AMBIEN PO   Dose:  10 mg        Take 10 mg by mouth nightly as needed   Refills:  0        ASPIRIN PO   Dose:  325 mg        Take 325 mg by mouth daily   Refills:  0        BACLOFEN PO   Dose:  20 mg        Take 20 mg by mouth 3 times daily as needed for muscle spasms   Refills:  0        clindamycin 300 MG capsule   Commonly known as:  CLEOCIN   Dose:   300 mg   Quantity:  21 capsule        Take 1 capsule (300 mg) by mouth 3 times daily for 7 days   Refills:  0        DIPHENHYDRAMINE HCL PO   Dose:  25 mg        Take 25 mg by mouth every 6 hours as needed Use as needed for itching   Refills:  0        divalproex 500 MG 24 hr tablet   Commonly known as:  DEPAKOTE ER   Dose:  2000 mg        Take 2,000 mg by mouth At Bedtime   Refills:  0        GABAPENTIN PO   Dose:  800 mg        Take 800 mg by mouth 4 times daily   Refills:  0        LATUDA PO   Dose:  80 mg        Take 80 mg by mouth daily   Refills:  0        lithium 450 MG CR tablet   Commonly known as:  ESKALITH   Dose:  450 mg        Take 450 mg by mouth 2 times daily   Refills:  0        * order for DME   Quantity:  1 Device        Equipment being ordered: aluminum walker x 4 months   Refills:  0        * order for DME   Quantity:  1 Device        Equipment being ordered: CAM   Refills:  0        * order for DME   Quantity:  1 Device        Equipment being ordered: tall CAM, size 8   Refills:  0        * order for DME   Quantity:  1 Device        Equipment being ordered: size 10 triloc ankle brace right lower extremity   Refills:  0        OXCARBAZEPINE PO   Dose:  600 mg        Take 600 mg by mouth 2 times daily   Refills:  0        oxyCODONE IR 5 MG tablet   Commonly known as:  ROXICODONE   Dose:  5 mg   Quantity:  30 tablet        Take 1 tablet (5 mg) by mouth every 4 hours as needed for pain (moderate to severe pain)   Refills:  0        prazosin 2 MG capsule   Commonly known as:  MINIPRESS   Dose:  2 mg        Take 2 mg by mouth At Bedtime 1-3 capsules at bedtime   Refills:  0        PRILOSEC PO   Dose:  20 mg        Take 20 mg by mouth every morning   Refills:  0        SAPHRIS SL   Dose:  5 mg        Place 5 mg under the tongue At Bedtime   Refills:  0        senna-docusate 8.6-50 MG per tablet   Commonly known as:  SENOKOT-S;PERICOLACE   Dose:  1 tablet   Quantity:  20 tablet        Take 1 tablet by  "mouth 2 times daily for 10 days Take while on oral narcotics to prevent or treat constipation.   Refills:  0        * Notice:  This list has 4 medication(s) that are the same as other medications prescribed for you. Read the directions carefully, and ask your doctor or other care provider to review them with you.            Prescriptions were sent or printed at these locations (1 Prescription)                   Other Prescriptions                Printed at Department/Unit printer (1 of 1)         oxyCODONE IR (ROXICODONE) 5 MG tablet                Orders Needing Specimen Collection     None      Pending Results     No orders found from 12/23/2017 to 12/26/2017.            Pending Culture Results     No orders found from 12/23/2017 to 12/26/2017.            Pending Results Instructions     If you had any lab results that were not finalized at the time of your Discharge, you can call the ED Lab Result RN at [phone]. You will be contacted by this team for any positive Lab results or changes in treatment. The nurses are available 7 days a week from 10A to 6:30P.  You can leave a message 24 hours per day and they will return your call.        Thank you for choosing [facility]       Thank you for choosing [facility] for your care. Our goal is always to provide you with excellent care. Hearing back from our patients is one way we can continue to improve our services. Please take a few minutes to complete the written survey that you may receive in the mail after you visit with us. Thank you!        [facility] Information     [employer] lets you send messages to your doctor, view your test results, renew your prescriptions, schedule appointments and more. To sign up, go to [email or web address] . Click on \"Log in\" on the left side of the screen, which will take you to the Welcome page. Then click on \"Sign up Now\" on the right side of the page.     You will be asked to enter the access code listed below, as well as some " personal information. Please follow the directions to create your username and password.     Your access code is: [email or web address]  Expires: 2018  5:31 AM     Your access code will  in 90 days. If you need help or a new code, please call your [facility] clinic or [phone].        Care EveryWhere ID     This is your Care EveryWhere ID. This could be used by other organizations to access your [facility] medical records  [id number]        Equal Access to Services     [facility] : Christy lujan Sokayla, waaxdarius luqadaha, qaybta kaalmadarius chang, tri long . So [facility] [phone].    ATENCIÓN: Si habla español, tiene a araiza disposición servicios gratuitos de asistencia lingüística. Llame al [phone].    We comply with applicable federal civil rights laws and [place] laws. We do not discriminate on the basis of race, color, national origin, age, disability, sex, sexual orientation, or gender identity.            After Visit Summary       This is your record. Keep this with you and show to your community pharmacist(s) and doctor(s) at your next visit.
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
## 2017-12-26 NOTE — ED NOTES
Pt comes in for pain med refill. Pt had jaw surgery Tuesday, was given 5 days of pain meds to help with post op pain.

## 2018-01-13 ENCOUNTER — RADIANT APPOINTMENT (OUTPATIENT)
Dept: CT IMAGING | Facility: CLINIC | Age: 48
End: 2018-01-13
Attending: PODIATRIST
Payer: COMMERCIAL

## 2018-01-13 DIAGNOSIS — S93.491A SPRAIN OF OTHER LIGAMENT OF RIGHT ANKLE, INITIAL ENCOUNTER: ICD-10-CM

## 2018-01-13 DIAGNOSIS — G57.51 TARSAL TUNNEL SYNDROME, RIGHT: ICD-10-CM

## 2018-01-22 ENCOUNTER — TELEPHONE (OUTPATIENT)
Dept: PODIATRY | Facility: CLINIC | Age: 48
End: 2018-01-22

## 2018-01-22 NOTE — TELEPHONE ENCOUNTER
I called and LVM for Christiana to discuss CT results, advised she call back to clinic to discuss treatment options.    Savage Samuels DPM   Podiatric Foot & Ankle Surgeon  [facility]  [phone]

## 2018-04-05 ENCOUNTER — OFFICE VISIT (OUTPATIENT)
Dept: PODIATRY | Facility: CLINIC | Age: 48
End: 2018-04-05
Payer: COMMERCIAL

## 2018-04-05 ENCOUNTER — RADIANT APPOINTMENT (OUTPATIENT)
Dept: GENERAL RADIOLOGY | Facility: CLINIC | Age: 48
End: 2018-04-05
Attending: PODIATRIST
Payer: COMMERCIAL

## 2018-04-05 VITALS — HEIGHT: 67 IN | BODY MASS INDEX: 33.12 KG/M2 | WEIGHT: 211 LBS | HEART RATE: 90 BPM

## 2018-04-05 DIAGNOSIS — M25.572 ACUTE LEFT ANKLE PAIN: Primary | ICD-10-CM

## 2018-04-05 DIAGNOSIS — M25.572 ACUTE LEFT ANKLE PAIN: ICD-10-CM

## 2018-04-05 PROCEDURE — 99214 OFFICE O/P EST MOD 30 MIN: CPT | Performed by: PODIATRIST

## 2018-04-05 PROCEDURE — 73610 X-RAY EXAM OF ANKLE: CPT | Mod: LT

## 2018-04-05 ASSESSMENT — PAIN SCALES - GENERAL: PAINLEVEL: EXTREME PAIN (8)

## 2018-04-05 NOTE — PROGRESS NOTES
"Foot & Ankle Surgery   April 5, 2018    S:  Pt is seen today for evaluation of left ankle pain. She states what was happening in her left ankle is now happening in her right, and her right ankle \"gave out\" and she tried catching herself but hurt her left ankle in the process.  It sounds like she had xrays at the [facility] but there are no results/CareEverywhere reports in her chart.  She's wearing an ankle brace.  She states there's a burning pain in the ankle.  At the end of the exam, she also states she think she got a fungal infection in her foot from her daughter, who has athlete's foot and walks around barefoot    Vitals:    04/05/18 1514   Pulse: 90   Weight: 211 lb (95.7 kg)   Height: 5' 7\" (1.702 m)   '      ROS - Pos for CC.  Patient denies current nausea, vomiting, chills, fevers, belly pain, calf pain, chest pain or SOB.  Complete remainder of ROS it otherwise neg.      PE:  Gen:   No apparent distress  Eye:    Visual scanning without deficit  Ear:    Response to auditory stimuli wnl  Lung:    Non-labored breathing on RA noted  Abd:    NTND per patient report  Lymph:    Neg for pitting/non-pitting edema BLE  Vasc:    Pulses palpable, CFT minimally delayed  Neuro:    Light touch sensation intact to all sensory nerve distributions without paresthesias  Derm:    Neg for nodules, lesions or ulcerations  MSK:    left lower extremity - very tender circumferentially around the ankle/rearfoot, with specific pain at medial soft spot/anterior gutter.  She also has pain at the sinus tarsi and discomfort with PROM of the STJ.  She also is painful at medial ankle, just distal to medial malleolus, and along Achilles, although no evidence of rupture.  Right lower extremity - mild discomfort at medial soft spot and anterior gutter  Calf:    Neg for redness, swelling or tenderness      Imaging:  3 views WB - intact hardware medial malleolus/tibia.  No hardware pathology noted.  Joint looks mildly irregular, although joint " line appears well-maintained.  Post-operative changes distal fibula.     -CT right ankle - IMPRESSION:   1. Significant osteochondral lesions, most pronounced medially in the  talar dome.  2. Subchondral sclerosis involving the posterior subtalar joint with  mild fragmentation of the os trigonum and edema in Karger's fat pad.  3. No evidence of acute osseous abnormalities.  4. An MRI would be better suited to evaluate for tarsal tunnel  syndrome.    Assessment:  47 year old female with new left ankle injury; right ankle pain/instability      Plan:  Discussed etiologies, anatomy and options  1.  Left ankle new injury  -personally reviewed imaging  -main areas of pain are ankle joint and sinus tarsi.  Advised RICE/NSAID based on symptoms  -continue ankle brace as needed  -fluoro-guided steroid injection for ankle and sinus tarsi ordered @ [facility]  -briefly discussed surgical options based on injection results.  If significant pain goes away with injections, consider ankle arthroscopy and sinus tarsi evacuation.    2.  Right ankle pain  -personally reviewed CT results  -fluoro-guided diagnostic/therapeutic steroid injection @ [facility]  -consider arthroscopy based on injection results.  OCDs likely contributing, consider debridement/grafting if necessary.         Follow up:  Prn based on injection results or sooner with acute issues      Body mass index is 33.05 kg/(m^2).  Weight management plan: Patient was referred to their PCP to discuss a diet and exercise plan.         Savage Samuels DPM   Podiatric Foot & Ankle Surgeon  [facility]  [phone]

## 2018-04-05 NOTE — MR AVS SNAPSHOT
After Visit Summary   4/5/2018    Christiana Sabillon    MRN: [id number]           Patient Information     Date Of Birth          1970        Visit Information        Provider Department      4/5/2018 3:00 PM Savage Tucker DPM [facility]        Today's Diagnoses     Acute left ankle pain    -  1      Care Instructions    Thank you for choosing [facility] / Foot & Ankle Surgery!    DR. TUCKER'S CLINIC LOCATIONS:   MONDAY - [place] TUESDAY - [place]   [address] [facility]  [address]   [place] [address] [place] [address]   [phone]       THURSDAY AM - [place] THURSDAY PM - [facility]   [address]   [place] [address] [place] [address]   [phone]       FRIDAY AM - [place] SET UP SURGERY: [phone] [address] APPOINTMENTS: [phone]   [place] [address] BILLING QUESTIONS: [phone] FAX NUMBER: [phone]     Follow Up as needed    Body Mass Index (BMI)  Many things can cause foot and ankle problems. Foot structure, activity level, foot mechanics and injuries are common causes of pain. One very important issue that often goes unmentioned, is body weight. Extra weight can cause increased stress on muscles, ligaments, bones and tendons. Sometimes just a few extra pounds is all it takes to put one over her/his threshold. Without reducing that stress, it can be difficult to alleviate pain. Some people are uncomfortable addressing this issue, but we feel it is important for you to think about it. As Foot &  Ankle specialists, our job is addressing the lower extremity problem and possible causes. Regarding extra body weight, we encourage patients to discuss diet and weight management plans with their primary care doctors. It is this team approach that gives you the best opportunity for pain relief and getting you back on your feet.            Follow-ups  "after your visit        Who to contact     If you have questions or need follow up information about today's clinic visit or your schedule please contact [facility] directly at [phone].  Normal or non-critical lab and imaging results will be communicated to you by MyChart, letter or phone within 4 business days after the clinic has received the results. If you do not hear from us within 7 days, please contact the clinic through MyChart or phone. If you have a critical or abnormal lab result, we will notify you by phone as soon as possible.  Submit refill requests through [employer] or call your pharmacy and they will forward the refill request to us. Please allow 3 business days for your refill to be completed.          Additional Information About Your Visit        [facility] [employer] Information     [employer] lets you send messages to your doctor, view your test results, renew your prescriptions, schedule appointments and more. To sign up, go to [email or web address] . Click on \"Log in\" on the left side of the screen, which will take you to the Welcome page. Then click on \"Sign up Now\" on the right side of the page.     You will be asked to enter the access code listed below, as well as some personal information. Please follow the directions to create your username and password.     Your access code is: [email or web address]  Expires: 2018  3:49 PM     Your access code will  in 90 days. If you need help or a new code, please call your [place] clinic or [phone].        Care EveryWhere ID     This is your Care EveryWhere ID. This could be used by other organizations to access your [facility] medical records  [id number]        Your Vitals Were     Pulse Height BMI (Body Mass Index)             90 5' 7\" (1.702 m) 33.05 kg/m2          Blood Pressure from Last 3 Encounters:   17 (!) 155/95   17 132/74   10/26/17 118/88    Weight from Last 3 Encounters:   18 211 lb (95.7 kg)   17 209 lb " 7 oz (95 kg)   10/26/17 223 lb (101.2 kg)               Primary Care Provider Office Phone # Fax #    Delta Donis [phone]       [facility] [address] [facility] [address]        Equal Access to Services     CHEYANNEGHANSHYAM REGINA : Hadii aad ku hadasho Soomaali, waaxda luqadaha, qaybta kaalmada adeegyada, waxay idiin hayaan adeeg shivani lasabas ah. So [facility] [phone].    ATENCIÓN: Si habla español, tiene a araiza disposición servicios gratuitos de asistencia lingüística. Yanivraciel al [phone].    We comply with applicable federal civil rights laws and [place] laws. We do not discriminate on the basis of race, color, national origin, age, disability, sex, sexual orientation, or gender identity.            Thank you!     Thank you for choosing [facility]  for your care. Our goal is always to provide you with excellent care. Hearing back from our patients is one way we can continue to improve our services. Please take a few minutes to complete the written survey that you may receive in the mail after your visit with us. Thank you!             Your Updated Medication List - Protect others around you: Learn how to safely use, store and throw away your medicines at www.disposemymeds.org.          This list is accurate as of 4/5/18  3:49 PM.  Always use your most recent med list.                   Brand Name Dispense Instructions for use Diagnosis    ACETAMINOPHEN PO      Take 325 mg by mouth every 6 hours as needed for pain        albuterol 108 (90 BASE) MCG/ACT Inhaler    PROAIR HFA/PROVENTIL HFA/VENTOLIN HFA     Inhale 2 puffs into the lungs every 4 hours as needed for shortness of breath / dyspnea or wheezing        ALPRAZOLAM PO      Take 0.5 mg by mouth 2 times daily as needed for anxiety        AMBIEN PO      Take 10 mg by mouth nightly as needed        ASPIRIN PO      Take 325 mg by mouth daily        BACLOFEN PO      Take 20 mg by mouth 3 times daily as needed for  muscle spasms        DIPHENHYDRAMINE HCL PO      Take 25 mg by mouth every 6 hours as needed Use as needed for itching        divalproex sodium extended-release 500 MG 24 hr tablet    DEPAKOTE ER     Take 2,000 mg by mouth At Bedtime        GABAPENTIN PO      Take 800 mg by mouth 4 times daily        LATUDA PO      Take 80 mg by mouth daily        lithium 450 MG CR tablet    ESKALITH     Take 450 mg by mouth 2 times daily        order for DME     1 Device    Equipment being ordered: aluminum walker x 4 months    S/P foot surgery       * order for DME     1 Device    Equipment being ordered: CAM    Left ankle pain       * order for DME     1 Device    Equipment being ordered: tall CAM, size 8    Plantar fascial fibromatosis, Pain of left heel, Achilles tendinitis of left lower extremity, Peroneal tendonitis, left       * order for DME     1 Device    Equipment being ordered: size 10 triloc ankle brace right lower extremity    Sprain of other ligament of right ankle, initial encounter, Tarsal tunnel syndrome, right       OXCARBAZEPINE PO      Take 600 mg by mouth 2 times daily        prazosin 2 MG capsule    MINIPRESS     Take 2 mg by mouth At Bedtime 1-3 capsules at bedtime        PRILOSEC PO      Take 20 mg by mouth every morning        SAPHRIS SL      Place 5 mg under the tongue At Bedtime        * Notice:  This list has 3 medication(s) that are the same as other medications prescribed for you. Read the directions carefully, and ask your doctor or other care provider to review them with you.

## 2018-04-05 NOTE — NURSING NOTE
"Chief Complaint   Patient presents with     RECHECK       Initial There were no vitals taken for this visit. Estimated body mass index is 32.8 kg/(m^2) as calculated from the following:    Height as of 12/19/17: 5' 7\" (1.702 m).    Weight as of 12/19/17: 209 lb 7 oz (95 kg).  Medication Reconciliation: complete    "

## 2018-04-05 NOTE — PATIENT INSTRUCTIONS
Thank you for choosing [facility] / Foot & Ankle Surgery!    DR. TUCKER'S CLINIC LOCATIONS:   MONDAY - [place] TUESDAY - [place]   [address] [facility]  [address]   [place] [address] [place] [address]   [phone]       THURSDAY AM - [place] THURSDAY PM - [place]   [address] [facility] [address]   [place] [address] [place], [place] [address] [phone]       FRIDAY AM - [place] SET UP SURGERY: [phone] [address] APPOINTMENTS: [phone]   [place] [address] BILLING QUESTIONS: [phone] FAX NUMBER: [phone]     Follow Up as needed    Body Mass Index (BMI)  Many things can cause foot and ankle problems. Foot structure, activity level, foot mechanics and injuries are common causes of pain. One very important issue that often goes unmentioned, is body weight. Extra weight can cause increased stress on muscles, ligaments, bones and tendons. Sometimes just a few extra pounds is all it takes to put one over her/his threshold. Without reducing that stress, it can be difficult to alleviate pain. Some people are uncomfortable addressing this issue, but we feel it is important for you to think about it. As Foot &  Ankle specialists, our job is addressing the lower extremity problem and possible causes. Regarding extra body weight, we encourage patients to discuss diet and weight management plans with their primary care doctors. It is this team approach that gives you the best opportunity for pain relief and getting you back on your feet.

## 2018-05-25 ENCOUNTER — APPOINTMENT (OUTPATIENT)
Dept: GENERAL RADIOLOGY | Facility: CLINIC | Age: 48
End: 2018-05-25
Attending: FAMILY MEDICINE
Payer: COMMERCIAL

## 2018-05-25 ENCOUNTER — HOSPITAL ENCOUNTER (EMERGENCY)
Facility: CLINIC | Age: 48
Discharge: HOME | End: 2018-05-25
Attending: FAMILY MEDICINE | Admitting: FAMILY MEDICINE
Payer: COMMERCIAL

## 2018-05-25 VITALS
OXYGEN SATURATION: 98 % | SYSTOLIC BLOOD PRESSURE: 136 MMHG | TEMPERATURE: 95.6 F | RESPIRATION RATE: 18 BRPM | HEART RATE: 61 BPM | DIASTOLIC BLOOD PRESSURE: 68 MMHG

## 2018-05-25 DIAGNOSIS — S99.911A ANKLE INJURY, RIGHT, INITIAL ENCOUNTER: ICD-10-CM

## 2018-05-25 DIAGNOSIS — S99.921A FOOT INJURY, RIGHT, INITIAL ENCOUNTER: ICD-10-CM

## 2018-05-25 PROCEDURE — 25000132 ZZH RX MED GY IP 250 OP 250 PS 637: Performed by: FAMILY MEDICINE

## 2018-05-25 PROCEDURE — 73610 X-RAY EXAM OF ANKLE: CPT | Mod: RT

## 2018-05-25 PROCEDURE — 99283 EMERGENCY DEPT VISIT LOW MDM: CPT | Mod: Z6 | Performed by: FAMILY MEDICINE

## 2018-05-25 PROCEDURE — 99283 EMERGENCY DEPT VISIT LOW MDM: CPT | Performed by: FAMILY MEDICINE

## 2018-05-25 PROCEDURE — 73630 X-RAY EXAM OF FOOT: CPT | Mod: RT

## 2018-05-25 RX ORDER — OXYCODONE AND ACETAMINOPHEN 5; 325 MG/1; MG/1
2 TABLET ORAL ONCE
Status: COMPLETED | OUTPATIENT
Start: 2018-05-25 | End: 2018-05-25

## 2018-05-25 RX ORDER — OXYCODONE AND ACETAMINOPHEN 5; 325 MG/1; MG/1
1-2 TABLET ORAL
Qty: 8 TABLET | Refills: 0 | Status: SHIPPED | OUTPATIENT
Start: 2018-05-25

## 2018-05-25 RX ADMIN — OXYCODONE HYDROCHLORIDE AND ACETAMINOPHEN 2 TABLET: 5; 325 TABLET ORAL at 16:47

## 2018-05-25 NOTE — ED AVS SNAPSHOT
[facility] [place], Emergency Department    [address] [place] [address]    Phone:  [phone]    Fax:  [phone]                                       Christiana Sabillon   MRN: [id number]    Department:  [facility], Emergency Department   Date of Visit:  5/25/2018           After Visit Summary Signature Page     I have received my discharge instructions, and my questions have been answered. I have discussed any challenges I see with this plan with the nurse or doctor.    ..........................................................................................................................................  Patient/Patient Representative Signature      ..........................................................................................................................................  Patient Representative Print Name and Relationship to Patient    ..................................................               ................................................  Date                                            Time    ..........................................................................................................................................  Reviewed by Signature/Title    ...................................................              ..............................................  Date                                                            Time

## 2018-05-25 NOTE — DISCHARGE INSTRUCTIONS
Ice to all that hurts for several days. Use crutches with partial weight bearing. Cam boot when up and walking.  If you have stairs go up and down on your butt- do not use crutches  For the pain, tylenol during the day. If needed at night percocet.  See your podiatrist on Monday or Tuesday to follow up

## 2018-05-25 NOTE — ED AVS SNAPSHOT
[facility], Emergency Department    [address]    [place] [address]    Phone:  [phone]    Fax:  [phone]                                       Christiana Sabillon   MRN: [id number]    Department:  [facility], Emergency Department   Date of Visit:  5/25/2018           Patient Information     Date Of Birth          1970        Your diagnoses for this visit were:     Foot injury, right, initial encounter     Ankle injury, right, initial encounter        You were seen by Too Martines MD.        Discharge Instructions       Ice to all that hurts for several days. Use crutches with partial weight bearing. Cam boot when up and walking.  If you have stairs go up and down on your butt- do not use crutches  For the pain, tylenol during the day. If needed at night percocet.  See your podiatrist on Monday or Tuesday to follow up    24 Hour Appointment Hotline       To make an appointment at any [facility] clinic, call [phone] ([phone]). If you don't have a family doctor or clinic, we will help you find one. [facility] clinics are conveniently located to serve the needs of you and your family.          ED Discharge Orders     Ankilizer CAM boot           Crutches       Use gait belt during crutch training.                     Review of your medicines      START taking        Dose / Directions Last dose taken    oxyCODONE-acetaminophen 5-325 MG per tablet   Commonly known as:  PERCOCET   Dose:  1-2 tablet   Quantity:  8 tablet        Take 1-2 tablets by mouth nightly as needed for pain   Refills:  0          Our records show that you are taking the medicines listed below. If these are incorrect, please call your family doctor or clinic.        Dose / Directions Last dose taken    ACETAMINOPHEN PO   Dose:  325 mg        Take 325 mg by mouth every 6 hours as needed for pain   Refills:  0        albuterol 108 (90 Base) MCG/ACT Inhaler   Commonly known as:  PROAIR HFA/PROVENTIL HFA/VENTOLIN HFA    Dose:  2 puff        Inhale 2 puffs into the lungs every 4 hours as needed for shortness of breath / dyspnea or wheezing   Refills:  0        ALPRAZOLAM PO   Dose:  0.5 mg        Take 0.5 mg by mouth 2 times daily as needed for anxiety   Refills:  0        AMBIEN PO   Dose:  10 mg        Take 10 mg by mouth nightly as needed   Refills:  0        ASPIRIN PO   Dose:  325 mg        Take 325 mg by mouth daily   Refills:  0        BACLOFEN PO   Dose:  20 mg        Take 20 mg by mouth 3 times daily as needed for muscle spasms   Refills:  0        DIPHENHYDRAMINE HCL PO   Dose:  25 mg        Take 25 mg by mouth every 6 hours as needed Use as needed for itching   Refills:  0        divalproex sodium extended-release 500 MG 24 hr tablet   Commonly known as:  DEPAKOTE ER   Dose:  2000 mg        Take 2,000 mg by mouth At Bedtime   Refills:  0        GABAPENTIN PO   Dose:  800 mg        Take 800 mg by mouth 4 times daily   Refills:  0        LATUDA PO   Dose:  80 mg        Take 80 mg by mouth daily   Refills:  0        lithium 450 MG CR tablet   Commonly known as:  ESKALITH   Dose:  450 mg        Take 450 mg by mouth 2 times daily   Refills:  0        order for DME   Quantity:  1 Device        Equipment being ordered: aluminum walker x 4 months   Refills:  0        * order for DME   Quantity:  1 Device        Equipment being ordered: CAM   Refills:  0        * order for DME   Quantity:  1 Device        Equipment being ordered: tall CAM, size 8   Refills:  0        * order for DME   Quantity:  1 Device        Equipment being ordered: size 10 triloc ankle brace right lower extremity   Refills:  0        OXCARBAZEPINE PO   Dose:  600 mg        Take 600 mg by mouth 2 times daily   Refills:  0        prazosin 2 MG capsule   Commonly known as:  MINIPRESS   Dose:  2 mg        Take 2 mg by mouth At Bedtime 1-3 capsules at bedtime   Refills:  0        PRILOSEC PO   Dose:  20 mg        Take 20 mg by mouth every morning   Refills:  0         SAPHRIS SL   Dose:  5 mg        Place 5 mg under the tongue At Bedtime   Refills:  0        * Notice:  This list has 3 medication(s) that are the same as other medications prescribed for you. Read the directions carefully, and ask your doctor or other care provider to review them with you.            Information about OPIOIDS     PRESCRIPTION OPIOIDS: WHAT YOU NEED TO KNOW   You have a prescription for an opioid (narcotic) pain medicine. Opioids can cause addiction. If you have a history of chemical dependency of any type, you are at a higher risk of becoming addicted to opioids. Only take this medicine after all other options have been tried. Take it for as short a time and as few doses as possible.     Do not:    Drive. If you drive while taking these medicines, you could be arrested for driving under the influence (DUI).    Operate heavy machinery    Do any other dangerous activities while taking these medicines.     Drink any alcohol while taking these medicines.      Take with any other medicines that contain acetaminophen. Read all labels carefully. Look for the word  acetaminophen  or  Tylenol.  Ask your pharmacist if you have questions or are unsure.    Store your pills in a secure place, locked if possible. We will not replace any lost or stolen medicine. If you don t finish your medicine, please throw away (dispose) as directed by your pharmacist. The [place] Pollution Control Agency has more information about safe disposal: https://www.pca.[facility].[place].us/living-green/managing-unwanted-medications    All opioids tend to cause constipation. Drink plenty of water and eat foods that have a lot of fiber, such as fruits, vegetables, prune juice, apple juice and high-fiber cereal. Take a laxative (Miralax, milk of magnesia, Colace, Senna) if you don t move your bowels at least every other day.         Prescriptions were sent or printed at these locations (1 Prescription)                   Other Prescriptions  "               Printed at Department/Unit printer (1 of 1)         oxyCODONE-acetaminophen (PERCOCET) 5-325 MG per tablet                Procedures and tests performed during your visit     Ankle XR, G/E 3 views, right    Foot  XR, G/E 3 views, right      Orders Needing Specimen Collection     None      Pending Results     No orders found from 2018 to 2018.            Pending Culture Results     No orders found from 2018 to 2018.            Pending Results Instructions     If you had any lab results that were not finalized at the time of your Discharge, you can call the ED Lab Result RN at [phone]. You will be contacted by this team for any positive Lab results or changes in treatment. The nurses are available 7 days a week from 10A to 6:30P.  You can leave a message 24 hours per day and they will return your call.        Thank you for choosing [facility]       Thank you for choosing [facility] for your care. Our goal is always to provide you with excellent care. Hearing back from our patients is one way we can continue to improve our services. Please take a few minutes to complete the written survey that you may receive in the mail after you visit with us. Thank you!        [employer] Information     [employer] lets you send messages to your doctor, view your test results, renew your prescriptions, schedule appointments and more. To sign up, go to [email or web address] . Click on \"Log in\" on the left side of the screen, which will take you to the Welcome page. Then click on \"Sign up Now\" on the right side of the page.     You will be asked to enter the access code listed below, as well as some personal information. Please follow the directions to create your username and password.     Your access code is: [email or web address]  Expires: 2018  3:49 PM     Your access code will  in 90 days. If you need help or a new code, please call your [facility] clinic or [phone].        Care EveryWhere ID  "    This is your Care EveryWhere ID. This could be used by other organizations to access your [place] medical records  [id number]        Equal Access to Services     KAREN TAPIA : Christy Christianson, rico tyler, yo chang, tri waters. So [facility] [phone].    ATENCIÓN: Si habla español, tiene a araiza disposición servicios gratuitos de asistencia lingüística. Llame al [phone].    We comply with applicable federal civil rights laws and [place] laws. We do not discriminate on the basis of race, color, national origin, age, disability, sex, sexual orientation, or gender identity.            After Visit Summary       This is your record. Keep this with you and show to your community pharmacist(s) and doctor(s) at your next visit.
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
## 2018-05-29 ENCOUNTER — HOSPITAL ENCOUNTER (OUTPATIENT)
Age: 48
End: 2018-05-29
Admitting: PODIATRIST
Payer: COMMERCIAL

## 2018-07-20 ENCOUNTER — RADIANT APPOINTMENT (OUTPATIENT)
Dept: GENERAL RADIOLOGY | Facility: CLINIC | Age: 48
End: 2018-07-20
Attending: PODIATRIST
Payer: COMMERCIAL

## 2018-07-20 ENCOUNTER — OFFICE VISIT (OUTPATIENT)
Dept: PODIATRY | Facility: CLINIC | Age: 48
End: 2018-07-20
Payer: COMMERCIAL

## 2018-07-20 VITALS
DIASTOLIC BLOOD PRESSURE: 74 MMHG | WEIGHT: 205 LBS | HEIGHT: 66 IN | SYSTOLIC BLOOD PRESSURE: 118 MMHG | BODY MASS INDEX: 32.95 KG/M2

## 2018-07-20 DIAGNOSIS — M79.671 RIGHT FOOT PAIN: Primary | ICD-10-CM

## 2018-07-20 PROCEDURE — 73630 X-RAY EXAM OF FOOT: CPT | Mod: RT

## 2018-07-20 PROCEDURE — 99214 OFFICE O/P EST MOD 30 MIN: CPT | Performed by: PODIATRIST

## 2018-07-20 RX ORDER — CLINDAMYCIN HCL 300 MG
CAPSULE ORAL
Refills: 0 | COMMUNITY
Start: 2018-06-27 | End: 2018-08-21

## 2018-07-20 ASSESSMENT — PAIN SCALES - GENERAL: PAINLEVEL: EXTREME PAIN (9)

## 2018-07-20 NOTE — PATIENT INSTRUCTIONS
Thank you for choosing [facility]!    Follow up :  After Scan    DR. MTZ'S CLINIC LOCATIONS     MONDAY  [place] TUESDAY & FRIDAY AM  [place]   [facility]   [address]   [place] [address] [place] [address]   [phone]  [phone]  [phone]       WEDNESDAY  [place] SCHEDULE SURGERY: [phone]   [address] [facility] APPOINTMENTS: [phone]   [place] [address] BILLING QUESTIONS: [phone]   [phone]         PRICE THERAPY  Many aches and pains throughout the foot and ankle can be helped with many simple treatments. This is usually described as PRICE Therapy.      P - Protection - often times, inflammation/pain in the lower extremity is not able to improve simply because the areas involved are never allowed to rest. Every step we take can bother the problematic area. Protecting those areas is an important step in the healing process. This may involve a walking cast boot, a special insert/orthotic device, an ankle brace, or simply avoiding barefoot walking.    R - Rest - in addition to protecting the foot/ankle, resting is an important, but often times difficult, treatment option. Getting off your feet when they bother you, and specifically avoiding activities that cause pain/discomfort, are very beneficial to prevent, and treat, foot/ankle pain.      I - Ice - icing regularly can help to decrease inflammation and swelling in the foot, thus decreasing pain. Using an ice pack or a bag of frozen veggies works very well. Ice for 20 minutes multiple times per day as needed.  Do not place the ice directly on the skin as this can cause tissue damage.    C - Compression - using a compression wrap or an ACE wrap can help to decrease swelling, which can help to decrease pain. Wearing the wraps is generally not needed at night, but they should be worn on a regular basis when you are going to be on  your feet for prolonged periods as gravity tends to pull fluids down to your feet/ankles.    E - Elevation - elevating your lower extremities multiple times daily for 15-20 minutes can help to decrease swelling, which works well in decreasing pain levels.        Body Mass Index (BMI)  Many things can cause foot and ankle problems. Foot structure, activity level, foot mechanics and injuries are common causes of pain.  One very important issue that often goes unmentioned, is body weight. Extra weight can cause increased stress on muscles, ligaments, bones and tendons.  Sometimes just a few extra pounds is all it takes to put one over her/his threshold. Without reducing that stress, it can be difficult to alleviate pain. Some people are uncomfortable addressing this issue, but we feel it is important for you to think about it. As Foot &  Ankle specialists, our job is addressing the lower extremity problem and possible causes. Regarding extra body weight, we encourage patients to discuss diet and weight management plans with their primary care doctors. It is this team approach that gives you the best opportunity for pain relief and getting you back on your feet.          BLOOD CLOT PREVENTION - WEARING A CAST BOOT    Do not drive with the CAM boot  Do not wear during long-distance travel: long car rides / plane trips  Wear the boot when moving, regardless of your weight-bearing status    Any brace that extends up to the knee can increase your chance of developing a blood clot. Blood clots generally occur in the large veins of your calf, thigh, or abdomen. A blood clot may form when blood is stagnant in the veins while your leg is immobilized in the brace. Maria Teresa and relaxing the calf muscles by moving the ankle is the best method to avoid stagnant blood. Clarify with your doctor if you should be doing this as this may not be recommended for certain tendon surgeries.    Blood clots or deep venous thrombosis (DVT)  can be life threatening if a portion of the clot breaks away and travels through the veins to your lungs. This is called a pulmonary embolism (PE) which can result in death.    Symptoms of a DVT may include swelling, tenderness, a warm feeling or redness in the leg. DVT can occur in both legs, even if only one side is in a brace. If you have these symptoms, you should call your doctor immediately or go to the Emergency Room to have your leg scanned.     Symptoms of a pulmonary embolism (PE) include chest pain, shortness of breath or the need to breath rapidly that is not associated with exercise, difficulty breathing, rapid heart rate, coughing or coughing up blood, or a feeling of passing out. Chest pain can range from mild to knife-like pain while taking a deep breath. Pulmonary embolism can occur without any symptoms whatsoever. You need to be evaluated in a hospital emergency room immediately if you have symptoms of a PE. Please call 911 as PE is a life-threatening emergency.    Be certain that you understand how much ankle range of motion is allowed. Your foot and ankle problem is being immobilized by the brace, yet we do not want you to develop a blood clot. The velcro strap braces are intended to be removed for periodic exercise of the ankle. Your doctor will tell you if it is okay to do this depending on the type of surgery or injury you had.    Your own personal risk of developing a DVT or PE is dependent on many factors. A personal or family history of previous blood clot or a clotting disorder (such as thrombophilia) puts you at risk. Other risk factors include history of cancer, current use of estrogen medications (such as birth control pills or post menopausal medications), recent trauma or fracture, diabetes, recent heart attack, COPD, heart failure, pregnancy, obesity, advanced age, smoking, etc. Please make sure your doctor is aware if you have any of these risk factors.    [place] [facility]  SCHEDULING  They should be calling you within 24 hours to schedule your scan.  If not, please call the location discussed at your appointment.    1) [facility]:       [phone]      [address] [place] [address]    2) [facility]:      [phone]      [address] [place] [address]    3) [facility]:       [phone]      [address] [place] [address]    * We will call you with the results. Please allow 24-48 hours for the results to be read and final.            SMOKING CESSATION  What's in cigarette smoke? - Cigarette smoke contains over 4,000 chemicals. Nicotine is one of the main ingredients which is an insecticide/herbicide. It is poisonous to our nervous system, increases blood clotting risk, and decreases the body's defenses to fight off infection. Another chemical is Carbon Monoxide is an asphyxiating gas that permanently binds to blood cells and blocks the transport of oxygen. This leads to tissue death and decreases your metabolism. Tar is a chemical that coats your lungs and trachea which impairs new oxygen coming in and carbon dioxide getting out of your body.   How does smoking impact surgery? - Smoking is particularly hazardous with regards to surgery. Surgery puts stress on the body and a smoker's body is already under strain from these chemicals. Putting the two together, especially for an elective surgery, could be a recipe for disaster. Smoking before and after surgery increases your risk of heart problems, slow wound healing, delayed bone healing, blood clots, wound infection and anesthesia complications.   What are the benefits of quitting? - In 20 minutes your blood pressure will drop back down to normal. In 8 hours the carbon monoxide (a toxic gas) levels in your blood stream will drop by half, and oxygen levels will return to normal. In 48 hours your chance of having a heart attack will have  decreased. All nicotine will have left your body. Your sense of taste and smell will return to a normal level. In 72 hours your bronchial tubes will relax, and your energy levels will increase. In 2 weeks your circulation will increase, and it will continue to improve for the next 10 weeks.    Recommendations for elective surgery - Ideally, patients should quit smoking 8 weeks before and at least 2 weeks after elective surgery in order to avoid complications. Simply cutting back on the amount of cigarettes smoked per day does not offer any benefit or decrease the risk of poor wound healing, heart problems, and infection. Smokers should also start taking Vitamin C and B for two weeks before surgery and two weeks after surgery.    Ways to Stop Smokin. Nicotine patches, lozenges, or gum  2. Support Groups  3. Medications (see below)    List of Medications:  1. Varenicline Tartrate (CHANTIX)   2. Bupropion HCL (WELLBUTRIN, ZYBAN) - note: make sure Wellbutrin is for smoking cessation and not other issues   3. Nicotine Patch (NICODERM)   4. Nicotine Inhaler (NICOTROL)   5. Nicotine Gum Nicotine Polacrilex   6. Nicotine Lozenge: Nicotine Polacrilex (COMMIT)   * [facility] offers a smoking support group as well!  Please visit: [email or web address]  If you are interested in these, ask about getting a prescription or talk to your primary care doctor about what may be the best way for you to quit.

## 2018-07-20 NOTE — MR AVS SNAPSHOT
After Visit Summary   7/20/2018    Christiana Sabillon    MRN: [id number]           Patient Information     Date Of Birth          1970        Visit Information        Provider Department      7/20/2018 8:30 AM Cristian Mtz DPM [facility]        Today's Diagnoses     Right foot pain    -  1      Care Instructions    Thank you for choosing [facility]!    Follow up :  After Scan    DR. MTZ'S CLINIC LOCATIONS     MONDAY  [place] TUESDAY & FRIDAY AM  [place]   [address] [facility]   [address]   [place] [address] [place] [address]   [phone]  [phone]  [phone]       WEDNESDAY  [place] SCHEDULE SURGERY: [phone]   [address] [facility] APPOINTMENTS: [phone]   [place] [address] BILLING QUESTIONS: [phone]   [phone]         WELLER THERAPY  Many aches and pains throughout the foot and ankle can be helped with many simple treatments. This is usually described as PRICE Therapy.      P - Protection - often times, inflammation/pain in the lower extremity is not able to improve simply because the areas involved are never allowed to rest. Every step we take can bother the problematic area. Protecting those areas is an important step in the healing process. This may involve a walking cast boot, a special insert/orthotic device, an ankle brace, or simply avoiding barefoot walking.    R - Rest - in addition to protecting the foot/ankle, resting is an important, but often times difficult, treatment option. Getting off your feet when they bother you, and specifically avoiding activities that cause pain/discomfort, are very beneficial to prevent, and treat, foot/ankle pain.      I - Ice - icing regularly can help to decrease inflammation and swelling in the foot, thus decreasing pain. Using an ice pack or a bag of frozen veggies works very well. Ice for 20 minutes  multiple times per day as needed.  Do not place the ice directly on the skin as this can cause tissue damage.    C - Compression - using a compression wrap or an ACE wrap can help to decrease swelling, which can help to decrease pain. Wearing the wraps is generally not needed at night, but they should be worn on a regular basis when you are going to be on your feet for prolonged periods as gravity tends to pull fluids down to your feet/ankles.    E - Elevation - elevating your lower extremities multiple times daily for 15-20 minutes can help to decrease swelling, which works well in decreasing pain levels.        Body Mass Index (BMI)  Many things can cause foot and ankle problems. Foot structure, activity level, foot mechanics and injuries are common causes of pain.  One very important issue that often goes unmentioned, is body weight. Extra weight can cause increased stress on muscles, ligaments, bones and tendons.  Sometimes just a few extra pounds is all it takes to put one over her/his threshold. Without reducing that stress, it can be difficult to alleviate pain. Some people are uncomfortable addressing this issue, but we feel it is important for you to think about it. As Foot &  Ankle specialists, our job is addressing the lower extremity problem and possible causes. Regarding extra body weight, we encourage patients to discuss diet and weight management plans with their primary care doctors. It is this team approach that gives you the best opportunity for pain relief and getting you back on your feet.          BLOOD CLOT PREVENTION - WEARING A CAST BOOT    Do not drive with the CAM boot  Do not wear during long-distance travel: long car rides / plane trips  Wear the boot when moving, regardless of your weight-bearing status    Any brace that extends up to the knee can increase your chance of developing a blood clot. Blood clots generally occur in the large veins of your calf, thigh, or abdomen. A blood clot  may form when blood is stagnant in the veins while your leg is immobilized in the brace. Maria Teresa and relaxing the calf muscles by moving the ankle is the best method to avoid stagnant blood. Clarify with your doctor if you should be doing this as this may not be recommended for certain tendon surgeries.    Blood clots or deep venous thrombosis (DVT) can be life threatening if a portion of the clot breaks away and travels through the veins to your lungs. This is called a pulmonary embolism (PE) which can result in death.    Symptoms of a DVT may include swelling, tenderness, a warm feeling or redness in the leg. DVT can occur in both legs, even if only one side is in a brace. If you have these symptoms, you should call your doctor immediately or go to the Emergency Room to have your leg scanned.     Symptoms of a pulmonary embolism (PE) include chest pain, shortness of breath or the need to breath rapidly that is not associated with exercise, difficulty breathing, rapid heart rate, coughing or coughing up blood, or a feeling of passing out. Chest pain can range from mild to knife-like pain while taking a deep breath. Pulmonary embolism can occur without any symptoms whatsoever. You need to be evaluated in a hospital emergency room immediately if you have symptoms of a PE. Please call 911 as PE is a life-threatening emergency.    Be certain that you understand how much ankle range of motion is allowed. Your foot and ankle problem is being immobilized by the brace, yet we do not want you to develop a blood clot. The velcro strap braces are intended to be removed for periodic exercise of the ankle. Your doctor will tell you if it is okay to do this depending on the type of surgery or injury you had.    Your own personal risk of developing a DVT or PE is dependent on many factors. A personal or family history of previous blood clot or a clotting disorder (such as thrombophilia) puts you at risk. Other risk factors  include history of cancer, current use of estrogen medications (such as birth control pills or post menopausal medications), recent trauma or fracture, diabetes, recent heart attack, COPD, heart failure, pregnancy, obesity, advanced age, smoking, etc. Please make sure your doctor is aware if you have any of these risk factors.    [facility] SCHEDULING  They should be calling you within 24 hours to schedule your scan.  If not, please call the location discussed at your appointment.    1) [facility]:       [phone]      [address] [place] [place] [address]    2) [facility]:      [phone]      [address] [place] [address]    3) [facility]:       [phone]      [address] [place] [address]    * We will call you with the results. Please allow 24-48 hours for the results to be read and final.            SMOKING CESSATION  What's in cigarette smoke? - Cigarette smoke contains over 4,000 chemicals. Nicotine is one of the main ingredients which is an insecticide/herbicide. It is poisonous to our nervous system, increases blood clotting risk, and decreases the body's defenses to fight off infection. Another chemical is Carbon Monoxide is an asphyxiating gas that permanently binds to blood cells and blocks the transport of oxygen. This leads to tissue death and decreases your metabolism. Tar is a chemical that coats your lungs and trachea which impairs new oxygen coming in and carbon dioxide getting out of your body.   How does smoking impact surgery? - Smoking is particularly hazardous with regards to surgery. Surgery puts stress on the body and a smoker's body is already under strain from these chemicals. Putting the two together, especially for an elective surgery, could be a recipe for disaster. Smoking before and after surgery increases your risk of heart problems, slow wound healing, delayed bone healing, blood  clots, wound infection and anesthesia complications.   What are the benefits of quitting? - In 20 minutes your blood pressure will drop back down to normal. In 8 hours the carbon monoxide (a toxic gas) levels in your blood stream will drop by half, and oxygen levels will return to normal. In 48 hours your chance of having a heart attack will have decreased. All nicotine will have left your body. Your sense of taste and smell will return to a normal level. In 72 hours your bronchial tubes will relax, and your energy levels will increase. In 2 weeks your circulation will increase, and it will continue to improve for the next 10 weeks.    Recommendations for elective surgery - Ideally, patients should quit smoking 8 weeks before and at least 2 weeks after elective surgery in order to avoid complications. Simply cutting back on the amount of cigarettes smoked per day does not offer any benefit or decrease the risk of poor wound healing, heart problems, and infection. Smokers should also start taking Vitamin C and B for two weeks before surgery and two weeks after surgery.    Ways to Stop Smokin. Nicotine patches, lozenges, or gum  2. Support Groups  3. Medications (see below)    List of Medications:  1. Varenicline Tartrate (CHANTIX)   2. Bupropion HCL (WELLBUTRIN, ZYBAN) - note: make sure Wellbutrin is for smoking cessation and not other issues   3. Nicotine Patch (NICODERM)   4. Nicotine Inhaler (NICOTROL)   5. Nicotine Gum Nicotine Polacrilex   6. Nicotine Lozenge: Nicotine Polacrilex (COMMIT)   * [facility] offers a smoking support group as well!  Please visit: [email or web address]  If you are interested in these, ask about getting a prescription or talk to your primary care doctor about what may be the best way for you to quit.                 Follow-ups after your visit        Follow-up notes from your care team     Return if symptoms worsen or fail to improve.      Future tests that were  "ordered for you today     Open Future Orders        Priority Expected Expires Ordered    MR Foot Right w/o & w Contrast Routine  7/20/2019 7/20/2018            Who to contact     If you have questions or need follow up information about today's clinic visit or your schedule please contact [facility] directly at [phone].  Normal or non-critical lab and imaging results will be communicated to you by MyChart, letter or phone within 4 business days after the clinic has received the results. If you do not hear from us within 7 days, please contact the clinic through MyChart or phone. If you have a critical or abnormal lab result, we will notify you by phone as soon as possible.  Submit refill requests through [facility] [facility] or call your pharmacy and they will forward the refill request to us. Please allow 3 business days for your refill to be completed.          Additional Information About Your Visit        Care EveryWhere ID     This is your Care EveryWhere ID. This could be used by other organizations to access your [facility] medical records  [id number]        Your Vitals Were     Height BMI (Body Mass Index)                5' 6\" (1.676 m) 33.09 kg/m2           Blood Pressure from Last 3 Encounters:   07/20/18 118/74   05/25/18 136/68   12/25/17 (!) 155/95    Weight from Last 3 Encounters:   07/20/18 205 lb (93 kg)   04/05/18 211 lb (95.7 kg)   12/19/17 209 lb 7 oz (95 kg)              We Performed the Following     XR Foot Right G/E 3 Views        Primary Care Provider Office Phone # Fax #    Kate Joyce [phone]       [facility] [address] [facility] [address]        Equal Access to Services     [facility] : Hadii nayeli Christianson, rico tyler, qatri keller. So [facility] [phone].    ATENCIÓN: Si habla español, tiene a araiza disposición servicios gratuitos de asistencia lingüística. Llame al " [phone].    We comply with applicable federal civil rights laws and [place] laws. We do not discriminate on the basis of race, color, national origin, age, disability, sex, sexual orientation, or gender identity.            Thank you!     Thank you for choosing [facility]  for your care. Our goal is always to provide you with excellent care. Hearing back from our patients is one way we can continue to improve our services. Please take a few minutes to complete the written survey that you may receive in the mail after your visit with us. Thank you!             Your Updated Medication List - Protect others around you: Learn how to safely use, store and throw away your medicines at www.disposemymeds.org.          This list is accurate as of 7/20/18  9:32 AM.  Always use your most recent med list.                   Brand Name Dispense Instructions for use Diagnosis    ACETAMINOPHEN PO      Take 325 mg by mouth every 6 hours as needed for pain        albuterol 108 (90 Base) MCG/ACT Inhaler    PROAIR HFA/PROVENTIL HFA/VENTOLIN HFA     Inhale 2 puffs into the lungs every 4 hours as needed for shortness of breath / dyspnea or wheezing        ALPRAZOLAM PO      Take 0.5 mg by mouth 2 times daily as needed for anxiety        AMBIEN PO      Take 10 mg by mouth nightly as needed        ASPIRIN PO      Take 325 mg by mouth daily        BACLOFEN PO      Take 20 mg by mouth 3 times daily as needed for muscle spasms        clindamycin 300 MG capsule    CLEOCIN     TAKE 1 TAB EVERY 6 HOURS FOR 7 DAYS.        DIPHENHYDRAMINE HCL PO      Take 25 mg by mouth every 6 hours as needed Use as needed for itching        divalproex sodium extended-release 500 MG 24 hr tablet    DEPAKOTE ER     Take 2,000 mg by mouth At Bedtime        GABAPENTIN PO      Take 800 mg by mouth 4 times daily        LATUDA PO      Take 80 mg by mouth daily        lithium 450 MG CR tablet    ESKALITH     Take 450 mg by mouth 2 times daily         OXCARBAZEPINE PO      Take 600 mg by mouth 2 times daily        oxyCODONE-acetaminophen 5-325 MG per tablet    PERCOCET    8 tablet    Take 1-2 tablets by mouth nightly as needed for pain        prazosin 2 MG capsule    MINIPRESS     Take 2 mg by mouth At Bedtime 1-3 capsules at bedtime        PRILOSEC PO      Take 20 mg by mouth every morning        SAPHRIS SL      Place 5 mg under the tongue At Bedtime

## 2018-07-20 NOTE — LETTER
"    7/20/2018         RE: Christiana Sabillon  [address]  [facility] [address]        Dear Colleague,    Thank you for referring your patient, Christiana Sabillon, to the [facility]. Please see a copy of my visit note below.    PATIENT HISTORY:  Christiana Sabillon is a 47 year old female who presents to clinic for a R foot injury in May of this year.  Her \"ankle gave out,\" she hit a curb, and her \"toes split apart.\"  Pain has continued since that time.  She was seen at ED where XRs of R foot and ankle were neg.  She has been wearing a post op shoe for about a month.  9/10 pain per pt.  Worse with activity and better with rest.  Reports stiffness, limping.  Smoker.  Family hx of foot problems.  Retired.       EXAM:Vitals: /74  Ht 1.676 m (5' 6\")  Wt 93 kg (205 lb)  BMI 33.09 kg/m2  BMI= Body mass index is 33.09 kg/(m^2).    General appearance: Patient is alert and fully cooperative with history & exam.  No sign of distress is noted during the visit.     Dermatologic: Skin is intact to both lower extremities without significant lesions, rash or abrasion.  No paronychia or evidence of soft tissue infection is noted.     Vascular: DP & PT pulses are intact & regular bilaterally.  No significant edema or varicosities noted.  CFT and skin temperature are normal to both lower extremities.     Neurologic: Lower extremity sensation is intact to light touch.  No evidence of weakness or contracture in the lower extremities.  No evidence of neuropathy.     Musculoskeletal: R foot pain over dorsal right 2nd and 3rd metatarsals and in the 2nd IS.  Patient is ambulatory.  No gross ankle deformity noted.  No foot or ankle joint effusion is noted.    XRs of R foot reviewed with pt.  No acute findings.     ASSESSMENT: R foot pain, recent injury     PLAN:  Reviewed patient's chart in epic.  Discussed condition and treatment options including pros and cons.    Unclear etiology.  Possibilities include stress " reaction/fx, metatarsalgia, neuroma.  Will send for MRI.  Short aircast bootSwiftcourt.  PRUDENCE.  WBAT.  F/u after scan.       Cristian Bullard DPM, FACFAS    Weight management plan: Patient was referred to their PCP to discuss a diet and exercise plan.        Again, thank you for allowing me to participate in the care of your patient.        Sincerely,        Cristian Bullard DPM

## 2018-07-20 NOTE — PROGRESS NOTES
"PATIENT HISTORY:  Christiana Sabillon is a 47 year old female who presents to clinic for a R foot injury in May of this year.  Her \"ankle gave out,\" she hit a curb, and her \"toes split apart.\"  Pain has continued since that time.  She was seen at ED where XRs of R foot and ankle were neg.  She has been wearing a post op shoe for about a month.  9/10 pain per pt.  Worse with activity and better with rest.  Reports stiffness, limping.  Smoker.  Family hx of foot problems.  Retired.       EXAM:Vitals: /74  Ht 1.676 m (5' 6\")  Wt 93 kg (205 lb)  BMI 33.09 kg/m2  BMI= Body mass index is 33.09 kg/(m^2).    General appearance: Patient is alert and fully cooperative with history & exam.  No sign of distress is noted during the visit.     Dermatologic: Skin is intact to both lower extremities without significant lesions, rash or abrasion.  No paronychia or evidence of soft tissue infection is noted.     Vascular: DP & PT pulses are intact & regular bilaterally.  No significant edema or varicosities noted.  CFT and skin temperature are normal to both lower extremities.     Neurologic: Lower extremity sensation is intact to light touch.  No evidence of weakness or contracture in the lower extremities.  No evidence of neuropathy.     Musculoskeletal: R foot pain over dorsal right 2nd and 3rd metatarsals and in the 2nd IS.  Patient is ambulatory.  No gross ankle deformity noted.  No foot or ankle joint effusion is noted.    XRs of R foot reviewed with pt.  No acute findings.     ASSESSMENT: R foot pain, recent injury     PLAN:  Reviewed patient's chart in epic.  Discussed condition and treatment options including pros and cons.    Unclear etiology.  Possibilities include stress reaction/fx, metatarsalgia, neuroma.  Will send for MRI.  Short aircast Vestec.  PRUDENCE.  WBAT.  F/u after scan.       Cristian Bullard DPM, FACFAS    Weight management plan: Patient was referred to their PCP to discuss a diet and exercise " plan.

## 2018-08-21 ENCOUNTER — APPOINTMENT (OUTPATIENT)
Dept: GENERAL RADIOLOGY | Facility: CLINIC | Age: 48
End: 2018-08-21
Attending: EMERGENCY MEDICINE
Payer: COMMERCIAL

## 2018-08-21 ENCOUNTER — HOSPITAL ENCOUNTER (EMERGENCY)
Facility: CLINIC | Age: 48
Discharge: HOME | End: 2018-08-21
Attending: EMERGENCY MEDICINE | Admitting: EMERGENCY MEDICINE
Payer: COMMERCIAL

## 2018-08-21 VITALS
RESPIRATION RATE: 16 BRPM | BODY MASS INDEX: 35.35 KG/M2 | SYSTOLIC BLOOD PRESSURE: 148 MMHG | OXYGEN SATURATION: 97 % | WEIGHT: 219 LBS | HEART RATE: 67 BPM | TEMPERATURE: 96.7 F | DIASTOLIC BLOOD PRESSURE: 78 MMHG

## 2018-08-21 DIAGNOSIS — M79.671 CHRONIC FOOT PAIN, RIGHT: ICD-10-CM

## 2018-08-21 DIAGNOSIS — G89.29 CHRONIC FOOT PAIN, RIGHT: ICD-10-CM

## 2018-08-21 PROCEDURE — 73630 X-RAY EXAM OF FOOT: CPT | Mod: RT

## 2018-08-21 PROCEDURE — 99284 EMERGENCY DEPT VISIT MOD MDM: CPT | Mod: Z6 | Performed by: EMERGENCY MEDICINE

## 2018-08-21 PROCEDURE — 25000132 ZZH RX MED GY IP 250 OP 250 PS 637: Performed by: EMERGENCY MEDICINE

## 2018-08-21 PROCEDURE — 99283 EMERGENCY DEPT VISIT LOW MDM: CPT | Performed by: EMERGENCY MEDICINE

## 2018-08-21 RX ORDER — OXYCODONE HYDROCHLORIDE 5 MG/1
5 TABLET ORAL ONCE
Status: COMPLETED | OUTPATIENT
Start: 2018-08-21 | End: 2018-08-21

## 2018-08-21 RX ORDER — OXYCODONE HYDROCHLORIDE 5 MG/1
5 TABLET ORAL EVERY 6 HOURS PRN
Qty: 6 TABLET | Refills: 0 | Status: ON HOLD | OUTPATIENT
Start: 2018-08-21 | End: 2022-08-30

## 2018-08-21 RX ADMIN — OXYCODONE HYDROCHLORIDE 5 MG: 5 TABLET ORAL at 18:27

## 2018-08-21 ASSESSMENT — ENCOUNTER SYMPTOMS
VOMITING: 1
NAUSEA: 1

## 2018-08-21 NOTE — ED AVS SNAPSHOT
[facility] [place], Emergency Department    [address] [place] [address]    Phone:  [phone]    Fax:  [phone]                                       Christiana Sabillon   MRN: [id number]    Department:  [facility], Emergency Department   Date of Visit:  8/21/2018           After Visit Summary Signature Page     I have received my discharge instructions, and my questions have been answered. I have discussed any challenges I see with this plan with the nurse or doctor.    ..........................................................................................................................................  Patient/Patient Representative Signature      ..........................................................................................................................................  Patient Representative Print Name and Relationship to Patient    ..................................................               ................................................  Date                                            Time    ..........................................................................................................................................  Reviewed by Signature/Title    ...................................................              ..............................................  Date                                                            Time
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
## 2018-08-21 NOTE — DISCHARGE INSTRUCTIONS
Call your [facility] doctor tomorrow and ask them to help manage your chronic foot pain by prescribing medications for you.  Your X-ray is normal

## 2018-08-21 NOTE — ED AVS SNAPSHOT
[facility], Emergency Department    [address]    [place] [address]    Phone:  [phone]    Fax:  [phone]                                       Christiana Sabillon   MRN: [id number]    Department:  [facility], Emergency Department   Date of Visit:  8/21/2018           Patient Information     Date Of Birth          1970        Your diagnoses for this visit were:     Chronic foot pain, right        You were seen by Thuan Perdomo MD.        Discharge Instructions       Call your [facility] doctor tomorrow and ask them to help manage your chronic foot pain by prescribing medications for you.  Your X-ray is normal      24 Hour Appointment Hotline       To make an appointment at any [place] clinic, call [phone] ([phone]). If you don't have a family doctor or clinic, we will help you find one. [place] clinics are conveniently located to serve the needs of you and your family.             Review of your medicines      START taking        Dose / Directions Last dose taken    oxyCODONE IR 5 MG tablet   Commonly known as:  ROXICODONE   Dose:  5 mg   Quantity:  6 tablet        Take 1 tablet (5 mg) by mouth every 6 hours as needed for pain   Refills:  0          Our records show that you are taking the medicines listed below. If these are incorrect, please call your family doctor or clinic.        Dose / Directions Last dose taken    ACETAMINOPHEN PO   Dose:  325 mg        Take 325 mg by mouth every 6 hours as needed for pain   Refills:  0        albuterol 108 (90 Base) MCG/ACT inhaler   Commonly known as:  PROAIR HFA/PROVENTIL HFA/VENTOLIN HFA   Dose:  2 puff        Inhale 2 puffs into the lungs every 4 hours as needed for shortness of breath / dyspnea or wheezing   Refills:  0        ALPRAZOLAM PO   Dose:  0.5 mg        Take 0.5 mg by mouth 2 times daily as needed for anxiety   Refills:  0        AMBIEN PO   Dose:  10 mg        Take 10 mg by mouth nightly as needed   Refills:   0        ASPIRIN PO   Dose:  325 mg        Take 325 mg by mouth daily   Refills:  0        BACLOFEN PO   Dose:  20 mg        Take 20 mg by mouth 3 times daily as needed for muscle spasms   Refills:  0        DIPHENHYDRAMINE HCL PO   Dose:  25 mg        Take 25 mg by mouth every 6 hours as needed Use as needed for itching   Refills:  0        divalproex sodium extended-release 500 MG 24 hr tablet   Commonly known as:  DEPAKOTE ER   Dose:  2000 mg        Take 2,000 mg by mouth At Bedtime   Refills:  0        GABAPENTIN PO   Dose:  800 mg        Take 800 mg by mouth 4 times daily   Refills:  0        LATUDA PO   Dose:  80 mg        Take 80 mg by mouth daily   Refills:  0        lithium 450 MG CR tablet   Commonly known as:  ESKALITH   Dose:  450 mg        Take 450 mg by mouth 2 times daily   Refills:  0        order for DME   Quantity:  1 Device        Short aircast boot   Refills:  0        OXCARBAZEPINE PO   Dose:  600 mg        Take 600 mg by mouth 2 times daily   Refills:  0        oxyCODONE-acetaminophen 5-325 MG per tablet   Commonly known as:  PERCOCET   Dose:  1-2 tablet   Quantity:  8 tablet        Take 1-2 tablets by mouth nightly as needed for pain   Refills:  0        prazosin 2 MG capsule   Commonly known as:  MINIPRESS   Dose:  2 mg        Take 2 mg by mouth At Bedtime 1-3 capsules at bedtime   Refills:  0        PRILOSEC PO   Dose:  20 mg        Take 20 mg by mouth every morning   Refills:  0        SAPHRIS SL   Dose:  5 mg        Place 5 mg under the tongue At Bedtime   Refills:  0                Information about OPIOIDS     PRESCRIPTION OPIOIDS: WHAT YOU NEED TO KNOW   We gave you an opioid (narcotic) pain medicine. It is important to manage your pain, but opioids are not always the best choice. You should first try all the other options your care team gave you. Take this medicine for as short a time (and as few doses) as possible.    Some activities can increase your pain, such as bandage changes or  therapy sessions. It may help to take your pain medicine 30 to 60 minutes before these activities. Reduce your stress by getting enough sleep, working on hobbies you enjoy and practicing relaxation or meditation. Talk to your care team about ways to manage your pain beyond prescription opioids.    These medicines have risks:    DO NOT drive when on new or higher doses of pain medicine. These medicines can affect your alertness and reaction times, and you could be arrested for driving under the influence (DUI). If you need to use opioids long-term, talk to your care team about driving.    DO NOT operate heavy machinery    DO NOT do any other dangerous activities while taking these medicines.    DO NOT drink any alcohol while taking these medicines.     If the opioid prescribed includes acetaminophen, DO NOT take with any other medicines that contain acetaminophen. Read all labels carefully. Look for the word  acetaminophen  or  Tylenol.  Ask your pharmacist if you have questions or are unsure.    You can get addicted to pain medicines, especially if you have a history of addiction (chemical, alcohol or substance dependence). Talk to your care team about ways to reduce this risk.    All opioids tend to cause constipation. Drink plenty of water and eat foods that have a lot of fiber, such as fruits, vegetables, prune juice, apple juice and high-fiber cereal. Take a laxative (Miralax, milk of magnesia, Colace, Senna) if you don t move your bowels at least every other day. Other side effects include upset stomach, sleepiness, dizziness, throwing up, tolerance (needing more of the medicine to have the same effect), physical dependence and slowed breathing.    Store your pills in a secure place, locked if possible. We will not replace any lost or stolen medicine. If you don t finish your medicine, please throw away (dispose) as directed by your pharmacist. The [place] [employer] has more information about  safe disposal: [email or web address].[email or web address]/[email or web address]-medications        Prescriptions were sent or printed at these locations (1 Prescription)                   Other Prescriptions                Printed at Department/Unit printer (1 of 1)         oxyCODONE IR (ROXICODONE) 5 MG tablet                Procedures and tests performed during your visit     XR Foot Right 3 Views      Orders Needing Specimen Collection     None      Pending Results     No orders found from 8/19/2018 to 8/22/2018.            Pending Culture Results     No orders found from 8/19/2018 to 8/22/2018.            Pending Results Instructions     If you had any lab results that were not finalized at the time of your Discharge, you can call the ED Lab Result RN at [phone]. You will be contacted by this team for any positive Lab results or changes in treatment. The nurses are available 7 days a week from 10A to 6:30P.  You can leave a message 24 hours per day and they will return your call.        Thank you for choosing [facility]       Thank you for choosing [facility] for your care. Our goal is always to provide you with excellent care. Hearing back from our patients is one way we can continue to improve our services. Please take a few minutes to complete the written survey that you may receive in the mail after you visit with us. Thank you!        Care EveryWhere ID     This is your Care EveryWhere ID. This could be used by other organizations to access your [facility] medical records  [id number]        Equal Access to Services     KAREN TAPIA : Christy Christianson, wawalt tyler, qaybfransisco noelaltri mena. So [facility] [phone].    ATENCIÓN: Si habla español, tiene a araiza disposición servicios gratuitos de asistencia lingüística. Llame al [phone].    We comply with applicable federal civil rights laws and [place] laws. We do not discriminate on the basis of race,  color, national origin, age, disability, sex, sexual orientation, or gender identity.            After Visit Summary       This is your record. Keep this with you and show to your community pharmacist(s) and doctor(s) at your next visit.

## 2021-09-15 DIAGNOSIS — M26.621 ARTHRALGIA OF RIGHT TEMPOROMANDIBULAR JOINT: Primary | ICD-10-CM

## 2021-09-22 DIAGNOSIS — Z11.59 ENCOUNTER FOR SCREENING FOR OTHER VIRAL DISEASES: ICD-10-CM

## 2021-09-22 DIAGNOSIS — M26.621 ARTHRALGIA OF RIGHT TEMPOROMANDIBULAR JOINT: Primary | ICD-10-CM
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
## 2021-09-30 RX ORDER — CLINDAMYCIN PHOSPHATE 900 MG/50ML
900 INJECTION, SOLUTION INTRAVENOUS
Status: CANCELLED | OUTPATIENT
Start: 2021-09-30

## 2021-09-30 RX ORDER — CHLORHEXIDINE GLUCONATE ORAL RINSE 1.2 MG/ML
10 SOLUTION DENTAL ONCE
Status: CANCELLED | OUTPATIENT
Start: 2021-09-30 | End: 2021-09-30

## 2021-09-30 RX ORDER — CLINDAMYCIN PHOSPHATE 900 MG/50ML
900 INJECTION, SOLUTION INTRAVENOUS SEE ADMIN INSTRUCTIONS
Status: CANCELLED | OUTPATIENT
Start: 2021-09-30

## 2021-09-30 NOTE — PROGRESS NOTES
Oral and Maxillofacial Surgery  Pre-Operative History and Physical     History of Present Illness:  Christiana Sabillon is a 51 y/o female who presents for pre-operative discussion for right TMJ arthroscopy. She initially presented with intolerable right TMJ pain. She reports she had surgery with us in 2017 for her L TMJ and had heterotropic bone removed. Prior to her procedure in 2017, she suffered from open lock, closed lock, and significant left TMJ pain, which she states are the same symptoms she currently suffers from on her R TMJ. Her R TMJ symptoms started about 5 months ago and she has been having both open and closed lock 2-3 times a day and the locking typically happens right after she functions, especially with mastication and any large opening of her mouth. She saw her PCP, Dayana Echeverria MD of [facility] who prescribed her narcotics for  pain and ear drops and who also subsequently referred Christiana back to our clinic. Christiana states that her left TMJ does cause her some discomfort, but not as much as the right   Christiana obtained CT Facial Bones at [facility] and is now presenting for pre-operative history physical prior to right TMJ arthroscopy in the OR.     PMH:  Right otitis externa discomfort  Dysuria  Tardive dyskinesia  Anxiety  Schizoaffective disorder  PTSD  Borderline personality disorder  Knee joint pain  Diabetes Type II  Peripheral neuralgia  GERD    PAST SURGICAL HISTORY:  Ankle surgery  Hysterectomy    MEDICATIONS:  Aspirin  Zolpidem  Ventolin  Saphris  Alprazolam  Prazosin  Gabapentin  Ingrezza  Nystatin  Duloxetine  Cipro Otic Suspension  Oxycodone-acetaminophen    ALLERGIES:  Penicillin  Cephalexin  Keflex  Lamotrigine  Ibuprofen  Naproxen  Vicodin  Tylenol with codeine  Cipro  Tramadol    REVIEW OF SYSTEMS  ROS reviewed and negative aside from listed in HPI    PHYSICAL EXAM:  GEN: WD/WN, NAD  HEENT: NC/AT, EOMI, PERRL, no facial edema, induration or erythema, no abnormal skin lesions, inferior  border of mandible is palpable bilaterally, neck soft with no palpable lymph nodes, GUIDO >45mm, OP clear, uvula midline, fair oral hygiene, no vestibular edema, FOM ND/NT, no erythema/ulcerations/pigmentations/exophytic lesions. GUIDO=37mm, lateral cursive L=7mm and R=7mm, limited by pain bilaterally, R masseter and TMJ tender to palpation, no tenderness for L masseter and TMJ, clicking and popping bilateral TMJ  Neuro: AAOx4, CN V and CN VII intact    CT MaxFace CDI 8/25/2021  1. Advanced erosive facet arthropathy bilateral temporomandibular joints.   2. Calcifications in the left temporomandibular joint may indicate a component of synovial chondromatosis.  3. Restricted range of motion, left greater than right.    Assessment:   Christiana Sabillon is 51 y/o female with advanced erosive facet arthropathy of bilateral temporomandibular joints associated with right-sided signficiant discomfort    Plan:  - Patient to present to OR for right-sided TMJ arthroscopy (we briefly discussed the possibilty of bilateral arthroscopies of the TMJs, but are currently only planning for right-side)  -  We have discussed the risk, benefits, and alternatives of bilateral TMJ arthroplasty including, but not limited to bleeding, bruising, infection, swelling, pain, discomfort, injury to nerves, paralysis of facial muscles, vessels, and soft tissue, permanent facial numbness and weakness including permanent lip and chin numbness, permanent tongue numbness,need for another procedure, progression of temporomandibular joint symptoms/pain, unsightly scarring, blood loss, and need for transfusion as well as general anesthesia risk including heart attacks, stroke, and death.    No further work-up is indicated prior to scheduled procedure.     Findings, assessment, and plan discussed with Dr. Chaudhary    History and Physical Note Uploaded to yong Bueno DMD    EXAM: HIGH-RESOLUTION CT OF THE MAXILLOFACIAL STRUCTURES    CLINICAL INFORMATION:  50-year-old female with open and closed lock. Previous left TMJ surgery.    TECHNICAL INFORMATION: AP and lateral digital  radiographs were obtained. Multislice CT of the maxillofacial structures was performed utilizing 1.25 mm thick axial images. The data was reconstructed for the generation of sagittal and coronal reformatted images.    INTERPRETATION:  images reveal normal appearance of the visualized maxillofacial structures. Axial and reformatted images display normal aeration of the mastoid air cells bilaterally. The visualized paranasal sinuses are normally pneumatized. A septal spur to the left narrows the nasal cavity. The orbits are normal in appearance bilaterally.    There is advanced degeneration with flattening and regressive remodeling of the left condyle. There is shortening of the proximal segment with a large subcortical cyst. Multiple calcified loose bodies are seen ventral to the osteophyte projecting anteriorly from the condylar head. There is flattening of the articular eminence as well. Open-mouth images reveal severely restricted range of motion.    There is advanced regressive remodeling of the condyle of flattening of the articular surface. Irregularity of the articular eminence is also demonstrated. There is shortening of the proximal segment as well. Open-mouth images reveal mild to moderate restriction of range of motion with direct contact between the condyle and articular eminence.    CONCLUSION:    1. Advanced erosive facet arthropathy bilateral temporomandibular joints. Is there history of previous Proplast implant? This may lead to granulomatous response. Correlation with other forms of arthropathy is otherwise recommended.    2. Calcifications in the left temporomandibular joint may indicate a component of synovial chondromatosis.    3. Restricted range of motion, left greater than right.

## 2021-09-30 NOTE — PRE-PROCEDURE
ORAL & MAXILLOFACIAL SURGERY   PREOPERATIVE  NOTE:  Christiana Sabillon,  MRN: [id number],  : 1970      Date of Procedure:   2021    Pre-Operative Diagnosis:  1. Advanced erosive facet arthropathy of bilateral TMJ associated with right-sided significant discomfort    Planned Procedure:  1. Right-sided TMJ arthoscopy    Planned Anesthesia: General via nasal endotracheal tube    Attending Surgeon:  Dr. Thuan Chaudhary    Resident Surgeon:  Dr. Dominguez Bueno    Consent:  Written and verbal consent obtained from patient previously. Discussion included  risks/complications including but not limited post-operative pain, swelling, bleeding,  infection, hardware failure, nonunion, malunion, dehiscence of wounds,  temporary/permanent paresthesia/anesthesia of CN V3 mental nerve distribution/CN V2  maxillary nerve distribution, damage to CN VII (motor to muscles of facial expression)  with temporary or permanent paralysis, scar formation, malocclusion, failure to resolve  chief complaint, or need for additional procedures (occlusal equilibration/endodontic  therapy). Patient agrees to procedure as written, signed consent in chart.      Ally Vo DMD  Oral & Maxillofacial Surgery, PGY-1

## 2021-10-04 RX ORDER — DULOXETIN HYDROCHLORIDE 60 MG/1
CAPSULE, DELAYED RELEASE ORAL
Status: ON HOLD | COMMUNITY
Start: 2021-09-16 | End: 2022-08-30

## 2021-10-04 RX ORDER — DULOXETINE 40 MG/1
CAPSULE, DELAYED RELEASE ORAL
Status: ON HOLD | COMMUNITY
Start: 2021-06-30 | End: 2022-08-30

## 2021-10-04 RX ORDER — VALBENAZINE 80 MG/1
CAPSULE ORAL
COMMUNITY
Start: 2021-09-21

## 2021-10-04 RX ORDER — RISPERIDONE 1 MG/1
2 TABLET ORAL
Status: ON HOLD | COMMUNITY
Start: 2013-08-16 | End: 2022-08-30

## 2021-10-04 RX ORDER — ASPIRIN 325 MG
325 TABLET ORAL DAILY
Status: ON HOLD | COMMUNITY
Start: 2021-06-21 | End: 2022-08-30

## 2021-10-05 ENCOUNTER — ANESTHESIA EVENT (OUTPATIENT)
Dept: SURGERY | Facility: CLINIC | Age: 51
End: 2021-10-05

## 2021-10-06 ENCOUNTER — HOSPITAL ENCOUNTER (OUTPATIENT)
Facility: CLINIC | Age: 51
Discharge: HOME | End: 2021-10-06
Attending: DENTIST | Admitting: DENTIST
Payer: COMMERCIAL

## 2021-10-06 ENCOUNTER — ANESTHESIA (OUTPATIENT)
Dept: SURGERY | Facility: CLINIC | Age: 51
End: 2021-10-06

## 2021-10-06 DIAGNOSIS — M26.621 ARTHRALGIA OF RIGHT TEMPOROMANDIBULAR JOINT: ICD-10-CM

## 2021-10-06 PROCEDURE — 272N000001 HC OR GENERAL SUPPLY STERILE: Performed by: DENTIST

## 2022-04-19 DIAGNOSIS — M26.651 ARTHROPATHY OF RIGHT TEMPOROMANDIBULAR JOINT: Primary | ICD-10-CM
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
## 2022-08-29 ENCOUNTER — ANESTHESIA EVENT (OUTPATIENT)
Dept: SURGERY | Facility: CLINIC | Age: 52
End: 2022-08-29
Payer: COMMERCIAL

## 2022-08-29 NOTE — PRE-PROCEDURE
ORAL & MAXILLOFACIAL SURGERY   PREOPERATIVE  NOTE:  Christiana Sabillon,  MRN: [id number],  : 1970      Date of Procedure:   2022    Pre-Operative Diagnosis:  1. Erosive faucet arthropathy of bilateral TMJs  2. Right TMJ arthralgia      Planned Procedure:  1. Bilateral Arthroscopy with lysis and lavage      Planned Anesthesia: General via oral endotracheal tube    Attending Surgeon:  Dr. Thuan Chaudhary    Resident Surgeon:  Dr. Ev Brown    Resident Assistants:  Dr. Berkley Ambrose    Consent:  Written and verbal consent obtained from patient previously. Discussion included  risks/complications including but not limited post-operative pain, swelling, bleeding,  infection, hardware failure, nonunion, malunion, dehiscence of wounds,  temporary/permanent paresthesia/anesthesia of CN V3 mental nerve distribution/CN V2  maxillary nerve distribution, damage to CN VII (motor to muscles of facial expression)  with temporary or permanent paralysis, scar formation, malocclusion, failure to resolve  chief complaint, or need for additional procedures (occlusal equilibration/endodontic  therapy). Patient agrees to procedure as written, signed consent in chart.      Harjinder Collado DDS  Oral & Maxillofacial Surgery, PGY-1    ______________________________________________________________________________________________

## 2022-08-30 ENCOUNTER — HOSPITAL ENCOUNTER (OUTPATIENT)
Facility: CLINIC | Age: 52
Discharge: HOME | End: 2022-08-30
Attending: DENTIST | Admitting: DENTIST
Payer: COMMERCIAL

## 2022-08-30 ENCOUNTER — ANESTHESIA (OUTPATIENT)
Dept: SURGERY | Facility: CLINIC | Age: 52
End: 2022-08-30
Payer: COMMERCIAL

## 2022-08-30 VITALS
SYSTOLIC BLOOD PRESSURE: 101 MMHG | DIASTOLIC BLOOD PRESSURE: 65 MMHG | TEMPERATURE: 98.1 F | OXYGEN SATURATION: 94 % | HEART RATE: 58 BPM | RESPIRATION RATE: 12 BRPM | WEIGHT: 199.08 LBS | HEIGHT: 66 IN | BODY MASS INDEX: 31.99 KG/M2

## 2022-08-30 DIAGNOSIS — M26.609 TEMPOROMANDIBULAR DISORDER: Primary | ICD-10-CM

## 2022-08-30 LAB
CREAT SERPL-MCNC: 0.74 MG/DL (ref 0.51–0.95)
GFR SERPL CREATININE-BSD FRML MDRD: >90 ML/MIN/1.73M2
GLUCOSE BLDC GLUCOMTR-MCNC: 125 MG/DL (ref 70–99)
GLUCOSE BLDC GLUCOMTR-MCNC: 161 MG/DL (ref 70–99)
HGB BLD-MCNC: 13.4 G/DL (ref 11.7–15.7)
POTASSIUM SERPL-SCNC: 3.8 MMOL/L (ref 3.4–5.3)

## 2022-08-30 PROCEDURE — 84132 ASSAY OF SERUM POTASSIUM: CPT | Performed by: ANESTHESIOLOGY

## 2022-08-30 PROCEDURE — 82565 ASSAY OF CREATININE: CPT | Performed by: ANESTHESIOLOGY

## 2022-08-30 PROCEDURE — 250N000009 HC RX 250: Performed by: DENTIST

## 2022-08-30 PROCEDURE — 710N000012 HC RECOVERY PHASE 2, PER MINUTE: Performed by: DENTIST

## 2022-08-30 PROCEDURE — 250N000011 HC RX IP 250 OP 636: Performed by: ANESTHESIOLOGY

## 2022-08-30 PROCEDURE — 250N000011 HC RX IP 250 OP 636

## 2022-08-30 PROCEDURE — 999N000141 HC STATISTIC PRE-PROCEDURE NURSING ASSESSMENT: Performed by: DENTIST

## 2022-08-30 PROCEDURE — 360N000076 HC SURGERY LEVEL 3, PER MIN: Performed by: DENTIST

## 2022-08-30 PROCEDURE — 250N000009 HC RX 250

## 2022-08-30 PROCEDURE — 710N000010 HC RECOVERY PHASE 1, LEVEL 2, PER MIN: Performed by: DENTIST

## 2022-08-30 PROCEDURE — 258N000003 HC RX IP 258 OP 636

## 2022-08-30 PROCEDURE — 250N000025 HC SEVOFLURANE, PER MIN: Performed by: DENTIST

## 2022-08-30 PROCEDURE — 250N000013 HC RX MED GY IP 250 OP 250 PS 637

## 2022-08-30 PROCEDURE — 370N000017 HC ANESTHESIA TECHNICAL FEE, PER MIN: Performed by: DENTIST

## 2022-08-30 PROCEDURE — 85018 HEMOGLOBIN: CPT | Performed by: ANESTHESIOLOGY

## 2022-08-30 PROCEDURE — 82962 GLUCOSE BLOOD TEST: CPT

## 2022-08-30 RX ORDER — CLINDAMYCIN PHOSPHATE 900 MG/50ML
900 INJECTION, SOLUTION INTRAVENOUS
Status: COMPLETED | OUTPATIENT
Start: 2022-08-30 | End: 2022-08-30

## 2022-08-30 RX ORDER — ONDANSETRON 4 MG/1
4 TABLET, ORALLY DISINTEGRATING ORAL EVERY 30 MIN PRN
Status: DISCONTINUED | OUTPATIENT
Start: 2022-08-30 | End: 2022-08-30 | Stop reason: HOSPADM

## 2022-08-30 RX ORDER — SODIUM CHLORIDE, SODIUM LACTATE, POTASSIUM CHLORIDE, CALCIUM CHLORIDE 600; 310; 30; 20 MG/100ML; MG/100ML; MG/100ML; MG/100ML
INJECTION, SOLUTION INTRAVENOUS CONTINUOUS
Status: DISCONTINUED | OUTPATIENT
Start: 2022-08-30 | End: 2022-08-30 | Stop reason: HOSPADM

## 2022-08-30 RX ORDER — CHLORHEXIDINE GLUCONATE ORAL RINSE 1.2 MG/ML
10 SOLUTION DENTAL ONCE
Status: COMPLETED | OUTPATIENT
Start: 2022-08-30 | End: 2022-08-30

## 2022-08-30 RX ORDER — ONDANSETRON 2 MG/ML
4 INJECTION INTRAMUSCULAR; INTRAVENOUS EVERY 30 MIN PRN
Status: DISCONTINUED | OUTPATIENT
Start: 2022-08-30 | End: 2022-08-30 | Stop reason: HOSPADM

## 2022-08-30 RX ORDER — SODIUM CHLORIDE, SODIUM LACTATE, POTASSIUM CHLORIDE, CALCIUM CHLORIDE 600; 310; 30; 20 MG/100ML; MG/100ML; MG/100ML; MG/100ML
INJECTION, SOLUTION INTRAVENOUS CONTINUOUS PRN
Status: DISCONTINUED | OUTPATIENT
Start: 2022-08-30 | End: 2022-08-30

## 2022-08-30 RX ORDER — FENTANYL CITRATE 50 UG/ML
INJECTION, SOLUTION INTRAMUSCULAR; INTRAVENOUS PRN
Status: DISCONTINUED | OUTPATIENT
Start: 2022-08-30 | End: 2022-08-30

## 2022-08-30 RX ORDER — MEPERIDINE HYDROCHLORIDE 25 MG/ML
12.5 INJECTION INTRAMUSCULAR; INTRAVENOUS; SUBCUTANEOUS
Status: DISCONTINUED | OUTPATIENT
Start: 2022-08-30 | End: 2022-08-30 | Stop reason: HOSPADM

## 2022-08-30 RX ORDER — DEXAMETHASONE SODIUM PHOSPHATE 4 MG/ML
INJECTION, SOLUTION INTRA-ARTICULAR; INTRALESIONAL; INTRAMUSCULAR; INTRAVENOUS; SOFT TISSUE PRN
Status: DISCONTINUED | OUTPATIENT
Start: 2022-08-30 | End: 2022-08-30

## 2022-08-30 RX ORDER — FENTANYL CITRATE 50 UG/ML
25 INJECTION, SOLUTION INTRAMUSCULAR; INTRAVENOUS
Status: DISCONTINUED | OUTPATIENT
Start: 2022-08-30 | End: 2022-08-30 | Stop reason: HOSPADM

## 2022-08-30 RX ORDER — PROPOFOL 10 MG/ML
INJECTION, EMULSION INTRAVENOUS PRN
Status: DISCONTINUED | OUTPATIENT
Start: 2022-08-30 | End: 2022-08-30

## 2022-08-30 RX ORDER — HYDROMORPHONE HCL IN WATER/PF 6 MG/30 ML
0.2 PATIENT CONTROLLED ANALGESIA SYRINGE INTRAVENOUS EVERY 5 MIN PRN
Status: DISCONTINUED | OUTPATIENT
Start: 2022-08-30 | End: 2022-08-30 | Stop reason: HOSPADM

## 2022-08-30 RX ORDER — LIDOCAINE 40 MG/G
CREAM TOPICAL
Status: DISCONTINUED | OUTPATIENT
Start: 2022-08-30 | End: 2022-08-30 | Stop reason: HOSPADM

## 2022-08-30 RX ORDER — FENTANYL CITRATE 50 UG/ML
25 INJECTION, SOLUTION INTRAMUSCULAR; INTRAVENOUS EVERY 5 MIN PRN
Status: DISCONTINUED | OUTPATIENT
Start: 2022-08-30 | End: 2022-08-30 | Stop reason: HOSPADM

## 2022-08-30 RX ORDER — LIDOCAINE HYDROCHLORIDE 20 MG/ML
INJECTION, SOLUTION INFILTRATION; PERINEURAL PRN
Status: DISCONTINUED | OUTPATIENT
Start: 2022-08-30 | End: 2022-08-30

## 2022-08-30 RX ORDER — OXYCODONE HYDROCHLORIDE 5 MG/1
5 TABLET ORAL EVERY 4 HOURS PRN
Status: DISCONTINUED | OUTPATIENT
Start: 2022-08-30 | End: 2022-08-30 | Stop reason: HOSPADM

## 2022-08-30 RX ORDER — OXYCODONE HYDROCHLORIDE 5 MG/1
5 TABLET ORAL EVERY 6 HOURS PRN
Qty: 8 TABLET | Refills: 0 | Status: SHIPPED | OUTPATIENT
Start: 2022-08-30 | End: 2022-09-02

## 2022-08-30 RX ORDER — ACETAMINOPHEN 325 MG/1
325-650 TABLET ORAL EVERY 6 HOURS PRN
Qty: 28 TABLET | Refills: 0 | Status: SHIPPED | OUTPATIENT
Start: 2022-08-30 | End: 2022-09-06

## 2022-08-30 RX ORDER — ONDANSETRON 2 MG/ML
INJECTION INTRAMUSCULAR; INTRAVENOUS PRN
Status: DISCONTINUED | OUTPATIENT
Start: 2022-08-30 | End: 2022-08-30

## 2022-08-30 RX ORDER — CLINDAMYCIN PHOSPHATE 900 MG/50ML
900 INJECTION, SOLUTION INTRAVENOUS SEE ADMIN INSTRUCTIONS
Status: DISCONTINUED | OUTPATIENT
Start: 2022-08-30 | End: 2022-08-30 | Stop reason: HOSPADM

## 2022-08-30 RX ADMIN — FENTANYL CITRATE 25 MCG: 50 INJECTION, SOLUTION INTRAMUSCULAR; INTRAVENOUS at 14:32

## 2022-08-30 RX ADMIN — HYDROMORPHONE HYDROCHLORIDE 0.2 MG: 0.2 INJECTION, SOLUTION INTRAMUSCULAR; INTRAVENOUS; SUBCUTANEOUS at 15:30

## 2022-08-30 RX ADMIN — FENTANYL CITRATE 50 MCG: 50 INJECTION, SOLUTION INTRAMUSCULAR; INTRAVENOUS at 13:45

## 2022-08-30 RX ADMIN — ONDANSETRON 4 MG: 2 INJECTION INTRAMUSCULAR; INTRAVENOUS at 13:48

## 2022-08-30 RX ADMIN — PHENYLEPHRINE HYDROCHLORIDE 100 MCG: 10 INJECTION INTRAVENOUS at 13:40

## 2022-08-30 RX ADMIN — PHENYLEPHRINE HYDROCHLORIDE 100 MCG: 10 INJECTION INTRAVENOUS at 14:00

## 2022-08-30 RX ADMIN — MIDAZOLAM 2 MG: 1 INJECTION INTRAMUSCULAR; INTRAVENOUS at 13:09

## 2022-08-30 RX ADMIN — PROPOFOL 150 MG: 10 INJECTION, EMULSION INTRAVENOUS at 13:20

## 2022-08-30 RX ADMIN — SODIUM CHLORIDE, POTASSIUM CHLORIDE, SODIUM LACTATE AND CALCIUM CHLORIDE: 600; 310; 30; 20 INJECTION, SOLUTION INTRAVENOUS at 13:14

## 2022-08-30 RX ADMIN — Medication 50 MG: at 13:21

## 2022-08-30 RX ADMIN — LIDOCAINE HYDROCHLORIDE 100 MG: 20 INJECTION, SOLUTION INFILTRATION; PERINEURAL at 13:19

## 2022-08-30 RX ADMIN — CHLORHEXIDINE GLUCONATE 10 ML: 1.2 SOLUTION ORAL at 13:00

## 2022-08-30 RX ADMIN — DEXAMETHASONE SODIUM PHOSPHATE 10 MG: 4 INJECTION, SOLUTION INTRA-ARTICULAR; INTRALESIONAL; INTRAMUSCULAR; INTRAVENOUS; SOFT TISSUE at 13:30

## 2022-08-30 RX ADMIN — HYDROMORPHONE HYDROCHLORIDE 0.2 MG: 0.2 INJECTION, SOLUTION INTRAMUSCULAR; INTRAVENOUS; SUBCUTANEOUS at 15:09

## 2022-08-30 RX ADMIN — CLINDAMYCIN PHOSPHATE 900 MG: 900 INJECTION, SOLUTION INTRAVENOUS at 13:10

## 2022-08-30 RX ADMIN — SUGAMMADEX 200 MG: 100 INJECTION, SOLUTION INTRAVENOUS at 14:03

## 2022-08-30 RX ADMIN — PHENYLEPHRINE HYDROCHLORIDE 100 MCG: 10 INJECTION INTRAVENOUS at 13:54

## 2022-08-30 RX ADMIN — FENTANYL CITRATE 50 MCG: 50 INJECTION, SOLUTION INTRAMUSCULAR; INTRAVENOUS at 13:19

## 2022-08-30 ASSESSMENT — ACTIVITIES OF DAILY LIVING (ADL)
ADLS_ACUITY_SCORE: 33
ADLS_ACUITY_SCORE: 33
ADLS_ACUITY_SCORE: 35
ADLS_ACUITY_SCORE: 35
ADLS_ACUITY_SCORE: 33

## 2022-08-30 NOTE — BRIEF OP NOTE
[facility]    Brief Operative Note    Pre-operative diagnosis: Arthropathy of right temporomandibular joint [M26.651]  Post-operative diagnosis Same as pre-operative diagnosis    Procedure: Procedure(s):  ARTHROSCOPY, TEMPOROMANDIBULAR JOINT  Surgeon: Surgeon(s) and Role:     * Thuan Chaudhary DDS - Primary     * Ev Brown DDS - Resident - Assisting  Anesthesia: General   Estimated Blood Loss: Less than 10 ml    Drains: None  Specimens: * No specimens in log *  Findings:   None.  Complications: None.  Implants: * No implants in log *    Recommendations:  - Jaw bra on for pressure, small incision in front of right ear  - HOB elevated >30 degrees  - OK for discharge from OMFS perspective, orders entered

## 2022-08-30 NOTE — DISCHARGE INSTRUCTIONS
HOME CARE INSTRUCTIONS FOLLOWING SURGERY      1. WOUND CARE: Do not disturb the wound. There will be a dressing present, leave this area alone, if dressings fall off naturally that is OK, no need to replace them. If dressings are not off at your first follow up appointment we will address them. It is OK to clean the area, do not submerge in water, clean with gentle dabbing and dry with dabbing motion.     2. SWELLING: Facial swelling occurs following most dental extractions and oral surgery procedures. To help minimize swelling, apply an ice pack to the face for 20 minutes; remove for 20 minutes; alternating back and forth throughout the first day after surgery. To be most effective, the applications of ice packs should begin as soon as possible.   The maximum facial swelling normally occurs on days 2-5 following surgery. Once present, it can remain swollen for 7-10 days after surgery.     3. PAIN: A variable amount of pain follows most extractions or oral surgical procedures. If you are given a prescription for pain medication please use as directed. Many times analgesics are prescribed on a scheduled basis. Excessive or increased pain after the third day following surgery is not normal. Should this occur, please call the clinic and set up a follow up appointment if not already scheduled.     4. BLEEDING: A slight amount of bleeding or oozing is expected up to 24 hours after surgery. Theses conditions are no cause for alarm. Following your oral surgery, a small sterile gauze compress may be placed on the wound and we ask that you maintain steady biting pressure on the gauze for 1 hour.      5. NAUSEA: Nausea is not an uncommon event after surgery, and it is sometimes cause by narcotic pain medication. Nausea may be reduce by taking pills with food or water. Attempt to keep drinking small amounts of clear fluids if you find the nausea does not improve. Cola drinks with less carbonation may help with nausea.     6.  DISCOLORATION: Facial discoloration (black and blue bruising) often follows many extraction and oral surgery procedures. Discoloration is normal and is no cause for alarm. It may persist for several weeks.     7. JAW STIFFNESS: For several days following most oral procedures, the jaw may become somewhat stiff. Should jaw stiffness progress or persist after one week, notify you oral surgeon.     8. DIET: Soft diet is suggested post operatively for 5-7 days, advance as you can. Examples of soft foods include: masked potatoes, soups, applesauce, jell-o, ice cream, overcooked pasta.     Please use the Internet to look up liquid diets to help with ideas    9. PHYSICAL ACTIVITY/REST: Keep physical activity to a minimum. Avoid athletic and strenuous activities and get plenty of rest, however simple exercises such as walking is suggested.    10. BRUSHING: Resume your normal oral hygiene routine within 24 hours following surgery. Soreness ans swelling may not permit vigorous brushing of all areas, but please make every effort to clean your teeth within comfort.     11. NUMBNESS: Local anesthetics may be effective for as long as 24-48 hours. Should you experience numbness beyone this period, notify your oral surgeon.     AFTER HOURS CONTACT FOR EMERGENCIES:  Please call the [facility] switchboard at [phone] and ask to have the Oral and Maxillofacial Surgery Resident On-call paged.     It is our desire to make your recovery as smooth as possible. These instructions are given to assist you following your surgery. If you have any questions please call the clinic at [phone].

## 2022-08-30 NOTE — ANESTHESIA POSTPROCEDURE EVALUATION
Patient: Christiana Sabillon    Procedure: Procedure(s):  ARTHROSCOPY, TEMPOROMANDIBULAR JOINT       Anesthesia Type:  General    Note:  Disposition: Outpatient   Postop Pain Control: Uneventful            Sign Out: Well controlled pain   PONV: No   Neuro/Psych: Uneventful            Sign Out: Acceptable/Baseline neuro status   Airway/Respiratory: Uneventful            Sign Out: Acceptable/Baseline resp. status   CV/Hemodynamics: Uneventful            Sign Out: Acceptable CV status; No obvious hypovolemia; No obvious fluid overload   Other NRE: NONE   DID A NON-ROUTINE EVENT OCCUR? No           Last vitals:  Vitals Value Taken Time   /99 08/30/22 1515   Temp 36.6  C (97.8  F) 08/30/22 1415   Pulse 55 08/30/22 1525   Resp 10 08/30/22 1525   SpO2 97 % 08/30/22 1525   Vitals shown include unvalidated device data.    Electronically Signed By: William Del Valle MD  August 30, 2022  3:26 PM

## 2022-08-30 NOTE — ANESTHESIA PROCEDURE NOTES
Airway       Patient location during procedure: OR       Procedure Start/Stop Times: 8/30/2022 1:23 PM  Staff -        CRNA: Erick Coppola APRN CRNA       Performed By: CRNA  Consent for Airway        Urgency: elective  Indications and Patient Condition       Indications for airway management: kelsey-procedural       Induction type:intravenous       Mask difficulty assessment: 1 - vent by mask    Final Airway Details       Final airway type: endotracheal airway       Successful airway: ETT - single  Endotracheal Airway Details        ETT size (mm): 7.0       Cuffed: yes       Successful intubation technique: direct laryngoscopy       DL Blade Type: [place] 2       Grade View of Cords: 1       Adjucts: stylet       Position: Right       Measured from: gums/teeth       Secured at (cm): 21       Bite block used: None    Post intubation assessment        Placement verified by: capnometry, equal breath sounds and chest rise        Number of attempts at approach: 1       Number of other approaches attempted: 0       Secured with: silk tape       Ease of procedure: easy       Dentition: Intact and Unchanged    Medication(s) Administered   Medication Administration Time: 8/30/2022 1:23 PM
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
## 2022-08-30 NOTE — ANESTHESIA CARE TRANSFER NOTE
Patient: Christiana Sabillon    Procedure: Procedure(s):  ARTHROSCOPY, TEMPOROMANDIBULAR JOINT       Diagnosis: Arthropathy of right temporomandibular joint [M26.651]  Diagnosis Additional Information: No value filed.    Anesthesia Type:   General     Note:    Oropharynx: oropharynx clear of all foreign objects and spontaneously breathing  Level of Consciousness: awake  Oxygen Supplementation: nasal cannula  Level of Supplemental Oxygen (L/min / FiO2): 3  Independent Airway: airway patency satisfactory and stable  Dentition: dentition unchanged  Vital Signs Stable: post-procedure vital signs reviewed and stable  Report to RN Given: handoff report given  Patient transferred to: PACU    Handoff Report: Identifed the Patient, Identified the Reponsible Provider, Reviewed the pertinent medical history, Discussed the surgical course, Reviewed Intra-OP anesthesia mangement and issues during anesthesia, Set expectations for post-procedure period and Allowed opportunity for questions and acknowledgement of understanding      Vitals:  Vitals Value Taken Time   /89 08/30/22 1415   Temp     Pulse 64 08/30/22 1417   Resp 6 08/30/22 1417   SpO2 98 % 08/30/22 1417   Vitals shown include unvalidated device data.    Electronically Signed By: AYESHA Cardoza CRNA  August 30, 2022  2:18 PM

## 2022-09-06 NOTE — OP NOTE
OPERATIVE REPORT    Christiana Sabillon   : 1970   SEX: female   MRN: [id number]    DATE OF SERVICE: 2022    STAFF SURGEON: Thuan Chaudhary DDS, MD    RESIDENT SURGEON: Ev Brown DDS    : Berkley Ambrose DDS    PREOPERATIVE DIAGNOSIS:   1. 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 Right TMJ arthralgia    POSTOPERATIVE DIAGNOSIS:  1. 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 Right TMJ arthralgia    PROCEDURES PERFORMED: Right temporomandibular joint arthroscopy with lysis and lavage    ANESTHESIA: General via endotracheal intubation    INDICATIONS FOR OPERATION: Christiana Sabillon is a 51 year old female with a past medical history significant for HTN, prediabetes, anxiety, borderline personality disorder, fistula of stomach or duodenum, memory loss, mood disorder, PTSD, schizoaffective disorder, vitamin D deficiency, polysubstance abuse, who has a history of right TMJ pain that began around 2021. She also has a history of open and closed lock after mastication or opening her mouth too wide. Patient had left TMJ arthrotomy and removal of heterotopic bone on 2017 with Dr. Chaudhary. Prior to her procedure in , she suffered from open lock, closed lock, and significant left TMJ pain, which she states are the same symptoms she currently suffers from on her right TMJ.     DESCRIPTION OF PROCEDURE:   The patient was met in the preoperative holding area and informed consent was obtained. The surgical site was marked on the diagram and the patient was taken to the operating room. She was transferred to the operating room table and underwent a smooth induction of general anesthesia. An endotracheal tube was passed on the first attempt. The tube was secured by the anesthesia service. The patient was prepped and draped in standard fashion. A time-out was performed in accordance with [facility] policy.      Attention to the  right side. Insufflation of the right temporomandibular joint was performed by injecting 1 ml of 1% lidocaine without epinephrine into the superior joint space. A 15 blade was used to make a small skin incision. A sharp trocar was utilized in the same plane to access the superior joint space. The mandible was then held in inferior and lateral traction to simplify the access into the superior joint space. Upon entrance into the joint space, the superior joint space was irrigated with heparinized lactated Ringers. The arthroscope was then introduced into the superior joint space. Upon entrance of the joint space, significant findings included vascular markings and fibrillations, indicating inflammation. The superior joint space was then irrigated with 400 ml of heparinized lactated Ringers. Following adequate irrigation, the outflow port was removed, and Healon (hyaluronic acid) was injected into the superior joint space. The trocar was then withdrawn and pressure was placed in the lateral joint space. 5-0 fast-absorbing gut suture was used for primary closure of the skin incision site.      At the completion of the procedure, all counts were found to be correct. The patient was then turned over to the anesthesia service for emergence. She was extubated in the OR and taken to the PACU in good condition. The attending surgeon, Dr. Thuan Chaudhary, was present for the entire procedure/key portions of the procedure.     ESTIMATED BLOOD LOSS: 2 mL  FLUIDS: See anesthesia report  URINE OUTPUT: None  DRAINS: None  COMPLICATIONS: None  SPECIMENS: None  DISPOSITION: Transferred to PACU is stable condition      Ev Brown DDS  Oral & Maxillofacial Surgery PGY-4

## 2025-08-02 ENCOUNTER — HOSPITAL ENCOUNTER (EMERGENCY)
Facility: CLINIC | Age: 55
Discharge: HOME | End: 2025-08-02
Attending: EMERGENCY MEDICINE
Payer: COMMERCIAL

## 2025-08-02 DIAGNOSIS — K08.89 PAIN, DENTAL: Primary | ICD-10-CM

## 2025-08-02 PROCEDURE — 99283 EMERGENCY DEPT VISIT LOW MDM: CPT | Performed by: EMERGENCY MEDICINE

## 2025-08-02 PROCEDURE — 99284 EMERGENCY DEPT VISIT MOD MDM: CPT | Performed by: EMERGENCY MEDICINE

## 2025-08-02 PROCEDURE — 250N000013 HC RX MED GY IP 250 OP 250 PS 637: Performed by: EMERGENCY MEDICINE

## 2025-08-02 RX ORDER — OXYCODONE HYDROCHLORIDE 5 MG/1
5 TABLET ORAL EVERY 6 HOURS PRN
Qty: 6 TABLET | Refills: 0 | Status: SHIPPED | OUTPATIENT
Start: 2025-08-02

## 2025-08-02 RX ORDER — CLINDAMYCIN HYDROCHLORIDE 300 MG/1
300 CAPSULE ORAL ONCE
Status: COMPLETED | OUTPATIENT
Start: 2025-08-02 | End: 2025-08-02

## 2025-08-02 RX ORDER — CLINDAMYCIN HYDROCHLORIDE 300 MG/1
300 CAPSULE ORAL 4 TIMES DAILY
Qty: 30 CAPSULE | Refills: 0 | Status: SHIPPED | OUTPATIENT
Start: 2025-08-02

## 2025-08-02 RX ORDER — OXYCODONE HYDROCHLORIDE 5 MG/1
5 TABLET ORAL ONCE
Refills: 0 | Status: COMPLETED | OUTPATIENT
Start: 2025-08-02 | End: 2025-08-02

## 2025-08-02 RX ADMIN — CLINDAMYCIN HYDROCHLORIDE 300 MG: 300 CAPSULE ORAL at 06:46

## 2025-08-02 RX ADMIN — OXYCODONE 5 MG: 5 TABLET ORAL at 06:46

## 2025-08-02 ASSESSMENT — COLUMBIA-SUICIDE SEVERITY RATING SCALE - C-SSRS
6. HAVE YOU EVER DONE ANYTHING, STARTED TO DO ANYTHING, OR PREPARED TO DO ANYTHING TO END YOUR LIFE?: YES
2. HAVE YOU ACTUALLY HAD ANY THOUGHTS OF KILLING YOURSELF IN THE PAST MONTH?: NO
1. IN THE PAST MONTH, HAVE YOU WISHED YOU WERE DEAD OR WISHED YOU COULD GO TO SLEEP AND NOT WAKE UP?: NO

## 2025-08-02 ASSESSMENT — ACTIVITIES OF DAILY LIVING (ADL): ADLS_ACUITY_SCORE: 41

## (undated) DEVICE — BRUSH SURGICAL EZ SCRUB PLAIN 371603

## (undated) DEVICE — DRSG JAWBRA  95

## (undated) DEVICE — DRAPE POUCH IRR 1016

## (undated) DEVICE — GLOVE PROTEXIS POWDER FREE 8.5 ORTHOPEDIC 2D73ET85

## (undated) DEVICE — BLADE KNIFE SURG 15 371115

## (undated) DEVICE — DRAPE U SPLIT 74X120" 29440

## (undated) DEVICE — LINEN TOWEL PACK X5 5464

## (undated) DEVICE — TAPE CLOTH 2" CARDINAL 3TRCL02

## (undated) DEVICE — SPONGE KITTNER 30-101

## (undated) DEVICE — DRSG TELFA 3X8" 1238

## (undated) DEVICE — SU VICRYL 3-0 X-1 27" UND J458H

## (undated) DEVICE — PACK GOWN 3/PK DISP XL SBA32GPFCB

## (undated) DEVICE — SU VICRYL 4-0 P-3 18" UND  J494H

## (undated) DEVICE — TUBING IV 69" STERILE 1C8160S

## (undated) DEVICE — LIGHT HANDLE X1 31140133

## (undated) DEVICE — SU VICRYL 4-0 PS-2 18" UND J496H

## (undated) DEVICE — BLADE CLIPPER SGL USE 9680

## (undated) DEVICE — DRAPE STERI APERATURE 51X33" 1030

## (undated) DEVICE — PREP POVIDONE IODINE SOLUTION 10% 120ML

## (undated) DEVICE — DRSG GAUZE 4X4" TRAY 6939

## (undated) DEVICE — SU MONOCRYL 4-0 PS-2 27" UND Y426H

## (undated) DEVICE — NDL 21GA 1.5"

## (undated) DEVICE — DRSG BANDAID 1X3" FABRIC CURITY LATEX FREE KC44101

## (undated) DEVICE — GLOVE PROTEXIS POWDER FREE SMT 8.5 2D72PT85X

## (undated) DEVICE — SU DERMABOND ADVANCED .7ML DNX12

## (undated) DEVICE — SOL NACL 0.9% IRRIG 1000ML BOTTLE 2F7124

## (undated) DEVICE — LINEN TOWEL PACK X30 5481

## (undated) DEVICE — DRSG STERI STRIP 1/2X4" R1547

## (undated) DEVICE — CONNECTOR STOPCOCK 3 WAY MALE LL HI-FLO MX9311L

## (undated) DEVICE — TUBING PRESSURE TRANSDUCER MALE TO FEMALE LL 72" 50P172

## (undated) DEVICE — PREP POVIDONE IODINE SCRUB 7.5% 120ML

## (undated) DEVICE — BASIN EMESIS STERILE  SSK9005A

## (undated) DEVICE — NDL COUNTER 20CT 31142493

## (undated) DEVICE — ESU NDL COLORADO MICRO E1651

## (undated) DEVICE — SYR 10ML LL W/O NDL 302995

## (undated) DEVICE — SU VICRYL 4-0 RB-1 27" J304

## (undated) DEVICE — LABEL MEDICATION SYSTEM 3303-P

## (undated) DEVICE — BLADE SAW RECIP STRK EXCURSION 14.5X.635MM 5100-037-011

## (undated) DEVICE — SOL RINGERS LACTATED 1000ML BAG 07953-09

## (undated) DEVICE — PACK NEURO MINOR UMMC SNE32MNMU4

## (undated) DEVICE — THERABITE SYSTEM ADULT TH001

## (undated) DEVICE — PAD CHUX UNDERPAD 23X24" 7136

## (undated) DEVICE — PREP CHLORAPREP 26ML TINTED HI-LITE ORANGE 930815

## (undated) DEVICE — STPL SKIN 35W 059037

## (undated) DEVICE — SYR 50ML LL W/O NDL 309653

## (undated) DEVICE — DRSG COTTON BALL 6PK LCB62

## (undated) DEVICE — BLADE SAW OSCIL/SAG STRK MICRO 5.5X11.5X0.38MM 2296-003-410

## (undated) DEVICE — TAPE CLOTH ADHESIVE 3" LATEX 3554C

## (undated) DEVICE — PREP SKIN SCRUB TRAY 4461A

## (undated) DEVICE — ESU NDL COLORADO MICRO 3CM 45DEG N113A

## (undated) DEVICE — SU PLAIN FAST ABSORB 5-0 PC-1 18" 1915G

## (undated) DEVICE — DRAPE STERI TOWEL SM 1000

## (undated) DEVICE — SYR EAR BULB 3OZ 0035830

## (undated) DEVICE — PACK SET-UP STD 9102

## (undated) DEVICE — STIMULATOR NERVE NEURO-PULSE SURGICAL LOCATOR 30968-220

## (undated) DEVICE — ESU GROUND PAD ADULT W/CORD E7507

## (undated) DEVICE — ADH LIQUID MASTISOL TOPICAL VIAL 2-3ML 0523-48

## (undated) DEVICE — LINEN TOWEL PACK X6 WHITE 5487

## (undated) DEVICE — SOL WATER IRRIG 1000ML BOTTLE 2F7114

## (undated) RX ORDER — BUPIVACAINE HYDROCHLORIDE AND EPINEPHRINE 5; 5 MG/ML; UG/ML
INJECTION, SOLUTION EPIDURAL; INTRACAUDAL; PERINEURAL
Status: DISPENSED
Start: 2017-12-19

## (undated) RX ORDER — DEXAMETHASONE SODIUM PHOSPHATE 4 MG/ML
INJECTION, SOLUTION INTRA-ARTICULAR; INTRALESIONAL; INTRAMUSCULAR; INTRAVENOUS; SOFT TISSUE
Status: DISPENSED
Start: 2017-12-19

## (undated) RX ORDER — ONDANSETRON 2 MG/ML
INJECTION INTRAMUSCULAR; INTRAVENOUS
Status: DISPENSED
Start: 2017-12-19

## (undated) RX ORDER — FENTANYL CITRATE 50 UG/ML
INJECTION, SOLUTION INTRAMUSCULAR; INTRAVENOUS
Status: DISPENSED
Start: 2017-12-19

## (undated) RX ORDER — CLINDAMYCIN PHOSPHATE 900 MG/50ML
INJECTION, SOLUTION INTRAVENOUS
Status: DISPENSED
Start: 2017-12-19

## (undated) RX ORDER — PROPOFOL 10 MG/ML
INJECTION, EMULSION INTRAVENOUS
Status: DISPENSED
Start: 2021-10-07

## (undated) RX ORDER — FENTANYL CITRATE-0.9 % NACL/PF 10 MCG/ML
PLASTIC BAG, INJECTION (ML) INTRAVENOUS
Status: DISPENSED
Start: 2021-10-07

## (undated) RX ORDER — EPHEDRINE SULFATE 50 MG/ML
INJECTION, SOLUTION INTRAMUSCULAR; INTRAVENOUS; SUBCUTANEOUS
Status: DISPENSED
Start: 2021-10-07

## (undated) RX ORDER — HYDROMORPHONE HCL/0.9% NACL/PF 0.2MG/0.2
SYRINGE (ML) INTRAVENOUS
Status: DISPENSED
Start: 2017-12-19

## (undated) RX ORDER — FENTANYL CITRATE 50 UG/ML
INJECTION, SOLUTION INTRAMUSCULAR; INTRAVENOUS
Status: DISPENSED
Start: 2022-08-30

## (undated) RX ORDER — HYDROMORPHONE HCL IN WATER/PF 6 MG/30 ML
PATIENT CONTROLLED ANALGESIA SYRINGE INTRAVENOUS
Status: DISPENSED
Start: 2022-08-30

## (undated) RX ORDER — CLINDAMYCIN PHOSPHATE 900 MG/50ML
INJECTION, SOLUTION INTRAVENOUS
Status: DISPENSED
Start: 2022-08-30

## (undated) RX ORDER — HYDROMORPHONE HYDROCHLORIDE 1 MG/ML
INJECTION, SOLUTION INTRAMUSCULAR; INTRAVENOUS; SUBCUTANEOUS
Status: DISPENSED
Start: 2017-12-19

## (undated) RX ORDER — TRIAMCINOLONE ACETONIDE 40 MG/ML
INJECTION, SUSPENSION INTRA-ARTICULAR; INTRAMUSCULAR
Status: DISPENSED
Start: 2017-12-19

## (undated) RX ORDER — CHLORHEXIDINE GLUCONATE ORAL RINSE 1.2 MG/ML
SOLUTION DENTAL
Status: DISPENSED
Start: 2022-08-30

## (undated) RX ORDER — ONDANSETRON 2 MG/ML
INJECTION INTRAMUSCULAR; INTRAVENOUS
Status: DISPENSED
Start: 2021-10-07

## (undated) RX ORDER — LIDOCAINE HYDROCHLORIDE 20 MG/ML
INJECTION, SOLUTION EPIDURAL; INFILTRATION; INTRACAUDAL; PERINEURAL
Status: DISPENSED
Start: 2021-10-07